# Patient Record
Sex: FEMALE | Race: BLACK OR AFRICAN AMERICAN | NOT HISPANIC OR LATINO | Employment: OTHER | URBAN - METROPOLITAN AREA
[De-identification: names, ages, dates, MRNs, and addresses within clinical notes are randomized per-mention and may not be internally consistent; named-entity substitution may affect disease eponyms.]

---

## 2018-02-26 ENCOUNTER — HOSPITAL ENCOUNTER (INPATIENT)
Facility: HOSPITAL | Age: 78
LOS: 8 days | Discharge: HOSPICE | DRG: 871 | End: 2018-03-06
Attending: FAMILY MEDICINE | Admitting: INTERNAL MEDICINE
Payer: MEDICARE

## 2018-02-26 ENCOUNTER — ANESTHESIA EVENT (OUTPATIENT)
Dept: INTENSIVE CARE | Facility: HOSPITAL | Age: 78
DRG: 871 | End: 2018-02-26
Payer: MEDICARE

## 2018-02-26 ENCOUNTER — ANESTHESIA (OUTPATIENT)
Dept: INTENSIVE CARE | Facility: HOSPITAL | Age: 78
DRG: 871 | End: 2018-02-26
Payer: MEDICARE

## 2018-02-26 DIAGNOSIS — J96.01 ACUTE RESPIRATORY FAILURE WITH HYPOXIA AND HYPERCAPNIA: ICD-10-CM

## 2018-02-26 DIAGNOSIS — R56.9 SEIZURE: ICD-10-CM

## 2018-02-26 DIAGNOSIS — R79.89 ELEVATED TROPONIN: ICD-10-CM

## 2018-02-26 DIAGNOSIS — G93.40 ENCEPHALOPATHY: ICD-10-CM

## 2018-02-26 DIAGNOSIS — R57.9 SHOCK: ICD-10-CM

## 2018-02-26 DIAGNOSIS — R41.82 ALTERED MENTAL STATUS: ICD-10-CM

## 2018-02-26 DIAGNOSIS — I95.9 HYPOTENSION, UNSPECIFIED HYPOTENSION TYPE: ICD-10-CM

## 2018-02-26 DIAGNOSIS — R74.01 TRANSAMINITIS: ICD-10-CM

## 2018-02-26 DIAGNOSIS — J96.02 ACUTE RESPIRATORY FAILURE WITH HYPOXIA AND HYPERCAPNIA: ICD-10-CM

## 2018-02-26 DIAGNOSIS — A41.9 SEPTIC SHOCK: ICD-10-CM

## 2018-02-26 DIAGNOSIS — R41.82 ALTERED MENTAL STATUS, UNSPECIFIED ALTERED MENTAL STATUS TYPE: ICD-10-CM

## 2018-02-26 DIAGNOSIS — G93.40 ACUTE ENCEPHALOPATHY: ICD-10-CM

## 2018-02-26 DIAGNOSIS — R82.4 KETONURIA: ICD-10-CM

## 2018-02-26 DIAGNOSIS — N17.9 AKI (ACUTE KIDNEY INJURY): ICD-10-CM

## 2018-02-26 DIAGNOSIS — E87.20 ACIDOSIS: ICD-10-CM

## 2018-02-26 DIAGNOSIS — Z45.2 ENCOUNTER FOR CENTRAL LINE PLACEMENT: ICD-10-CM

## 2018-02-26 DIAGNOSIS — N18.30 CKD (CHRONIC KIDNEY DISEASE), STAGE III: ICD-10-CM

## 2018-02-26 DIAGNOSIS — R00.1 BRADYCARDIA: ICD-10-CM

## 2018-02-26 DIAGNOSIS — R41.82 ALTERED MENTAL STATE: Primary | ICD-10-CM

## 2018-02-26 DIAGNOSIS — F03.C0 ADVANCED DEMENTIA: ICD-10-CM

## 2018-02-26 DIAGNOSIS — N30.00 ACUTE CYSTITIS WITHOUT HEMATURIA: ICD-10-CM

## 2018-02-26 DIAGNOSIS — J18.9 PNEUMONIA OF RIGHT LOWER LOBE DUE TO INFECTIOUS ORGANISM: ICD-10-CM

## 2018-02-26 DIAGNOSIS — R65.21 SEPTIC SHOCK: ICD-10-CM

## 2018-02-26 DIAGNOSIS — K92.2 GASTROINTESTINAL HEMORRHAGE, UNSPECIFIED GASTROINTESTINAL HEMORRHAGE TYPE: ICD-10-CM

## 2018-02-26 LAB
ALBUMIN SERPL BCP-MCNC: 3 G/DL
ALBUMIN SERPL BCP-MCNC: 3.7 G/DL
ALLENS TEST: ABNORMAL
ALP SERPL-CCNC: 104 U/L
ALP SERPL-CCNC: 88 U/L
ALT SERPL W/O P-5'-P-CCNC: 305 U/L
ALT SERPL W/O P-5'-P-CCNC: 316 U/L
AMMONIA BLD-SCNC: <9 UMOL/L
AMPHET+METHAMPHET UR QL: NEGATIVE
ANION GAP SERPL CALC-SCNC: 10 MMOL/L
ANION GAP SERPL CALC-SCNC: 10 MMOL/L
ANISOCYTOSIS BLD QL SMEAR: SLIGHT
ANISOCYTOSIS BLD QL SMEAR: SLIGHT
APAP SERPL-MCNC: <3 UG/ML
AST SERPL-CCNC: 171 U/L
AST SERPL-CCNC: 194 U/L
BACTERIA #/AREA URNS AUTO: ABNORMAL /HPF
BARBITURATES UR QL SCN>200 NG/ML: NEGATIVE
BASOPHILS # BLD AUTO: 0 K/UL
BASOPHILS # BLD AUTO: ABNORMAL K/UL
BASOPHILS NFR BLD: 0 %
BASOPHILS NFR BLD: 0 %
BENZODIAZ UR QL SCN>200 NG/ML: NEGATIVE
BILIRUB SERPL-MCNC: 0.2 MG/DL
BILIRUB SERPL-MCNC: 0.3 MG/DL
BILIRUB UR QL STRIP: NEGATIVE
BUN SERPL-MCNC: 19 MG/DL
BUN SERPL-MCNC: 20 MG/DL
BURR CELLS BLD QL SMEAR: ABNORMAL
BURR CELLS BLD QL SMEAR: ABNORMAL
BZE UR QL SCN: NEGATIVE
CALCIUM SERPL-MCNC: 10.1 MG/DL
CALCIUM SERPL-MCNC: 9.1 MG/DL
CANNABINOIDS UR QL SCN: NEGATIVE
CHLORIDE SERPL-SCNC: 102 MMOL/L
CHLORIDE SERPL-SCNC: 106 MMOL/L
CLARITY UR REFRACT.AUTO: CLEAR
CO2 SERPL-SCNC: 22 MMOL/L
CO2 SERPL-SCNC: 30 MMOL/L
COLOR UR AUTO: YELLOW
CREAT SERPL-MCNC: 1.27 MG/DL
CREAT SERPL-MCNC: 1.4 MG/DL
CREAT UR-MCNC: 224.3 MG/DL
DELSYS: ABNORMAL
DIFFERENTIAL METHOD: ABNORMAL
DIFFERENTIAL METHOD: ABNORMAL
EOSINOPHIL # BLD AUTO: 0 K/UL
EOSINOPHIL # BLD AUTO: ABNORMAL K/UL
EOSINOPHIL NFR BLD: 0 %
EOSINOPHIL NFR BLD: 0.4 %
ERYTHROCYTE [DISTWIDTH] IN BLOOD BY AUTOMATED COUNT: 16.8 %
ERYTHROCYTE [DISTWIDTH] IN BLOOD BY AUTOMATED COUNT: 17.4 %
EST. GFR  (AFRICAN AMERICAN): 42 ML/MIN/1.73 M^2
EST. GFR  (AFRICAN AMERICAN): 46.7 ML/MIN/1.73 M^2
EST. GFR  (NON AFRICAN AMERICAN): 36 ML/MIN/1.73 M^2
EST. GFR  (NON AFRICAN AMERICAN): 40.5 ML/MIN/1.73 M^2
FLUAV AG SPEC QL IA: NEGATIVE
FLUBV AG SPEC QL IA: NEGATIVE
GLUCOSE SERPL-MCNC: 218 MG/DL
GLUCOSE SERPL-MCNC: 97 MG/DL
GLUCOSE UR QL STRIP: NEGATIVE
HCO3 UR-SCNC: 21.6 MMOL/L (ref 24–28)
HCO3 UR-SCNC: 23.2 MMOL/L (ref 24–28)
HCO3 UR-SCNC: 23.4 MMOL/L (ref 24–28)
HCT VFR BLD AUTO: 37.4 %
HCT VFR BLD AUTO: 38.2 %
HCT VFR BLD CALC: 35 %PCV (ref 36–54)
HGB BLD-MCNC: 12 G/DL
HGB BLD-MCNC: 12.5 G/DL
HGB BLD-MCNC: 12.6 G/DL
HGB UR QL STRIP: NEGATIVE
HYALINE CASTS UR QL AUTO: 0 /LPF
HYPOCHROMIA BLD QL SMEAR: ABNORMAL
KETONES UR QL STRIP: ABNORMAL
LACTATE SERPL-SCNC: 0.9 MMOL/L
LACTATE SERPL-SCNC: 0.9 MMOL/L
LACTATE SERPL-SCNC: 3.4 MMOL/L
LEUKOCYTE ESTERASE UR QL STRIP: ABNORMAL
LYMPHOCYTES # BLD AUTO: 0.9 K/UL
LYMPHOCYTES # BLD AUTO: ABNORMAL K/UL
LYMPHOCYTES NFR BLD: 16 %
LYMPHOCYTES NFR BLD: 8.2 %
MCH RBC QN AUTO: 31.2 PG
MCH RBC QN AUTO: 31.7 PG
MCHC RBC AUTO-ENTMCNC: 33 G/DL
MCHC RBC AUTO-ENTMCNC: 33.4 G/DL
MCV RBC AUTO: 95 FL
MCV RBC AUTO: 95 FL
METHADONE UR QL SCN>300 NG/ML: NEGATIVE
MICROSCOPIC COMMENT: ABNORMAL
MONOCYTES # BLD AUTO: 0.5 K/UL
MONOCYTES # BLD AUTO: ABNORMAL K/UL
MONOCYTES NFR BLD: 1 %
MONOCYTES NFR BLD: 4.5 %
NEUTROPHILS # BLD AUTO: 9.1 K/UL
NEUTROPHILS # BLD AUTO: ABNORMAL K/UL
NEUTROPHILS NFR BLD: 80 %
NEUTROPHILS NFR BLD: 86.9 %
NEUTS BAND NFR BLD MANUAL: 3 %
NITRITE UR QL STRIP: NEGATIVE
NT-PROBNP: 135 PG/ML
OB PNL STL: POSITIVE
OPIATES UR QL SCN: NEGATIVE
OVALOCYTES BLD QL SMEAR: ABNORMAL
PCO2 BLDA: 53.9 MMHG (ref 35–45)
PCO2 BLDA: 54 MMHG (ref 35–45)
PCO2 BLDA: 62.2 MMHG (ref 35–45)
PCP UR QL SCN>25 NG/ML: NEGATIVE
PH SMN: 7.18 [PH] (ref 7.35–7.45)
PH SMN: 7.21 [PH] (ref 7.35–7.45)
PH SMN: 7.25 [PH] (ref 7.35–7.45)
PH UR STRIP: 5 [PH] (ref 5–8)
PLATELET # BLD AUTO: 71 K/UL
PLATELET # BLD AUTO: 81 K/UL
PLATELET BLD QL SMEAR: ABNORMAL
PLATELET BLD QL SMEAR: ABNORMAL
PMV BLD AUTO: ABNORMAL FL
PMV BLD AUTO: ABNORMAL FL
PO2 BLDA: 47 MMHG (ref 80–100)
PO2 BLDA: 62 MMHG (ref 80–100)
PO2 BLDA: 89 MMHG (ref 80–100)
POC BE: -4 MMOL/L
POC BE: -5 MMOL/L
POC BE: -6 MMOL/L
POC IONIZED CALCIUM: 1.25 MMOL/L (ref 1.06–1.42)
POC SATURATED O2: 70 % (ref 95–100)
POC SATURATED O2: 86 % (ref 95–100)
POC SATURATED O2: 95 % (ref 95–100)
POC TCO2: 23 MMOL/L (ref 23–27)
POC TCO2: 25 MMOL/L (ref 23–27)
POC TCO2: 25 MMOL/L (ref 23–27)
POCT GLUCOSE: 135 MG/DL (ref 70–110)
POIKILOCYTOSIS BLD QL SMEAR: SLIGHT
POIKILOCYTOSIS BLD QL SMEAR: SLIGHT
POTASSIUM BLD-SCNC: 3.9 MMOL/L (ref 3.5–5.1)
POTASSIUM SERPL-SCNC: 3.7 MMOL/L
POTASSIUM SERPL-SCNC: 4.7 MMOL/L
PROT SERPL-MCNC: 6.8 G/DL
PROT SERPL-MCNC: 6.9 G/DL
PROT UR QL STRIP: ABNORMAL
RBC # BLD AUTO: 3.94 M/UL
RBC # BLD AUTO: 4.04 M/UL
RBC #/AREA URNS AUTO: 1 /HPF (ref 0–4)
SALICYLATES SERPL-MCNC: <5 MG/DL
SAMPLE: ABNORMAL
SITE: ABNORMAL
SODIUM BLD-SCNC: 141 MMOL/L (ref 136–145)
SODIUM SERPL-SCNC: 138 MMOL/L
SODIUM SERPL-SCNC: 142 MMOL/L
SP GR UR STRIP: 1.02 (ref 1–1.03)
SPECIMEN SOURCE: NORMAL
SQUAMOUS #/AREA URNS AUTO: 1 /HPF
TARGETS BLD QL SMEAR: ABNORMAL
TARGETS BLD QL SMEAR: ABNORMAL
TOXICOLOGY INFORMATION: NORMAL
TROPONIN I SERPL DL<=0.01 NG/ML-MCNC: 0.02 NG/ML
TROPONIN I SERPL DL<=0.01 NG/ML-MCNC: 0.02 NG/ML
URN SPEC COLLECT METH UR: ABNORMAL
UROBILINOGEN UR STRIP-ACNC: 1 EU/DL
WBC # BLD AUTO: 10.44 K/UL
WBC # BLD AUTO: 9.13 K/UL
WBC #/AREA URNS AUTO: 2 /HPF (ref 0–5)

## 2018-02-26 PROCEDURE — 94760 N-INVAS EAR/PLS OXIMETRY 1: CPT

## 2018-02-26 PROCEDURE — 94761 N-INVAS EAR/PLS OXIMETRY MLT: CPT

## 2018-02-26 PROCEDURE — 0BH17EZ INSERTION OF ENDOTRACHEAL AIRWAY INTO TRACHEA, VIA NATURAL OR ARTIFICIAL OPENING: ICD-10-PCS | Performed by: ANESTHESIOLOGY

## 2018-02-26 PROCEDURE — 83605 ASSAY OF LACTIC ACID: CPT

## 2018-02-26 PROCEDURE — 37799 UNLISTED PX VASCULAR SURGERY: CPT

## 2018-02-26 PROCEDURE — 81000 URINALYSIS NONAUTO W/SCOPE: CPT

## 2018-02-26 PROCEDURE — 83880 ASSAY OF NATRIURETIC PEPTIDE: CPT

## 2018-02-26 PROCEDURE — 63600175 PHARM REV CODE 636 W HCPCS: Performed by: STUDENT IN AN ORGANIZED HEALTH CARE EDUCATION/TRAINING PROGRAM

## 2018-02-26 PROCEDURE — 93005 ELECTROCARDIOGRAM TRACING: CPT

## 2018-02-26 PROCEDURE — 36600 WITHDRAWAL OF ARTERIAL BLOOD: CPT

## 2018-02-26 PROCEDURE — 80329 ANALGESICS NON-OPIOID 1 OR 2: CPT

## 2018-02-26 PROCEDURE — 25000003 PHARM REV CODE 250: Performed by: INTERNAL MEDICINE

## 2018-02-26 PROCEDURE — 83605 ASSAY OF LACTIC ACID: CPT | Mod: 91

## 2018-02-26 PROCEDURE — 82803 BLOOD GASES ANY COMBINATION: CPT

## 2018-02-26 PROCEDURE — 20000000 HC ICU ROOM

## 2018-02-26 PROCEDURE — 84439 ASSAY OF FREE THYROXINE: CPT

## 2018-02-26 PROCEDURE — 85025 COMPLETE CBC W/AUTO DIFF WBC: CPT

## 2018-02-26 PROCEDURE — 96375 TX/PRO/DX INJ NEW DRUG ADDON: CPT | Mod: 59

## 2018-02-26 PROCEDURE — 63600175 PHARM REV CODE 636 W HCPCS

## 2018-02-26 PROCEDURE — 83690 ASSAY OF LIPASE: CPT

## 2018-02-26 PROCEDURE — 27000221 HC OXYGEN, UP TO 24 HOURS

## 2018-02-26 PROCEDURE — 63600175 PHARM REV CODE 636 W HCPCS: Performed by: INTERNAL MEDICINE

## 2018-02-26 PROCEDURE — 85027 COMPLETE CBC AUTOMATED: CPT

## 2018-02-26 PROCEDURE — 84443 ASSAY THYROID STIM HORMONE: CPT

## 2018-02-26 PROCEDURE — 96366 THER/PROPH/DIAG IV INF ADDON: CPT | Mod: 59

## 2018-02-26 PROCEDURE — 99285 EMERGENCY DEPT VISIT HI MDM: CPT | Mod: 25

## 2018-02-26 PROCEDURE — 87086 URINE CULTURE/COLONY COUNT: CPT

## 2018-02-26 PROCEDURE — 84145 PROCALCITONIN (PCT): CPT

## 2018-02-26 PROCEDURE — 02HV33Z INSERTION OF INFUSION DEVICE INTO SUPERIOR VENA CAVA, PERCUTANEOUS APPROACH: ICD-10-PCS | Performed by: FAMILY MEDICINE

## 2018-02-26 PROCEDURE — 96361 HYDRATE IV INFUSION ADD-ON: CPT | Mod: 59

## 2018-02-26 PROCEDURE — 96365 THER/PROPH/DIAG IV INF INIT: CPT | Mod: 59

## 2018-02-26 PROCEDURE — 36556 INSERT NON-TUNNEL CV CATH: CPT

## 2018-02-26 PROCEDURE — 85007 BL SMEAR W/DIFF WBC COUNT: CPT

## 2018-02-26 PROCEDURE — 80074 ACUTE HEPATITIS PANEL: CPT

## 2018-02-26 PROCEDURE — 80053 COMPREHEN METABOLIC PANEL: CPT

## 2018-02-26 PROCEDURE — 93010 ELECTROCARDIOGRAM REPORT: CPT | Mod: ,,, | Performed by: INTERNAL MEDICINE

## 2018-02-26 PROCEDURE — 87400 INFLUENZA A/B EACH AG IA: CPT

## 2018-02-26 PROCEDURE — 94002 VENT MGMT INPAT INIT DAY: CPT

## 2018-02-26 PROCEDURE — 5A1945Z RESPIRATORY VENTILATION, 24-96 CONSECUTIVE HOURS: ICD-10-PCS | Performed by: INTERNAL MEDICINE

## 2018-02-26 PROCEDURE — 80307 DRUG TEST PRSMV CHEM ANLYZR: CPT

## 2018-02-26 PROCEDURE — 80053 COMPREHEN METABOLIC PANEL: CPT | Mod: 91

## 2018-02-26 PROCEDURE — 82272 OCCULT BLD FECES 1-3 TESTS: CPT

## 2018-02-26 PROCEDURE — 84484 ASSAY OF TROPONIN QUANT: CPT

## 2018-02-26 PROCEDURE — 25000003 PHARM REV CODE 250: Performed by: STUDENT IN AN ORGANIZED HEALTH CARE EDUCATION/TRAINING PROGRAM

## 2018-02-26 PROCEDURE — 87040 BLOOD CULTURE FOR BACTERIA: CPT

## 2018-02-26 PROCEDURE — 36415 COLL VENOUS BLD VENIPUNCTURE: CPT

## 2018-02-26 PROCEDURE — 84484 ASSAY OF TROPONIN QUANT: CPT | Mod: 91

## 2018-02-26 PROCEDURE — 27100108

## 2018-02-26 PROCEDURE — 82140 ASSAY OF AMMONIA: CPT

## 2018-02-26 PROCEDURE — 25000003 PHARM REV CODE 250: Performed by: FAMILY MEDICINE

## 2018-02-26 PROCEDURE — 63600175 PHARM REV CODE 636 W HCPCS: Performed by: FAMILY MEDICINE

## 2018-02-26 PROCEDURE — 27200966 HC CLOSED SUCTION SYSTEM

## 2018-02-26 RX ORDER — PHENYLEPHRINE HYDROCHLORIDE 10 MG/ML
INJECTION INTRAVENOUS
Status: DISPENSED
Start: 2018-02-26 | End: 2018-02-27

## 2018-02-26 RX ORDER — FENTANYL CITRATE 50 UG/ML
INJECTION, SOLUTION INTRAMUSCULAR; INTRAVENOUS
Status: COMPLETED
Start: 2018-02-26 | End: 2018-02-26

## 2018-02-26 RX ORDER — CEFTRIAXONE 1 G/1
1 INJECTION, POWDER, FOR SOLUTION INTRAMUSCULAR; INTRAVENOUS
Status: COMPLETED | OUTPATIENT
Start: 2018-02-26 | End: 2018-02-26

## 2018-02-26 RX ORDER — FENTANYL CITRAT/DEXTROSE 5%/PF 100 MCG/10
PATIENT CONTROLLED ANALGESIA SYRINGE INTRAVENOUS CONTINUOUS
Status: DISCONTINUED | OUTPATIENT
Start: 2018-02-26 | End: 2018-02-28

## 2018-02-26 RX ORDER — AMLODIPINE BESYLATE 5 MG/1
5 TABLET ORAL DAILY
COMMUNITY

## 2018-02-26 RX ORDER — OSELTAMIVIR PHOSPHATE 6 MG/ML
75 FOR SUSPENSION ORAL 2 TIMES DAILY
Status: DISCONTINUED | OUTPATIENT
Start: 2018-02-26 | End: 2018-02-27 | Stop reason: DRUGHIGH

## 2018-02-26 RX ORDER — NOREPINEPHRINE BITARTRATE/D5W 16MG/250ML
0.02 PLASTIC BAG, INJECTION (ML) INTRAVENOUS CONTINUOUS
Status: DISCONTINUED | OUTPATIENT
Start: 2018-02-26 | End: 2018-03-01

## 2018-02-26 RX ORDER — DOPAMINE HYDROCHLORIDE 160 MG/100ML
INJECTION, SOLUTION INTRAVENOUS
Status: DISPENSED
Start: 2018-02-26 | End: 2018-02-27

## 2018-02-26 RX ORDER — DOPAMINE HYDROCHLORIDE 160 MG/100ML
10 INJECTION, SOLUTION INTRAVENOUS CONTINUOUS
Status: DISCONTINUED | OUTPATIENT
Start: 2018-02-26 | End: 2018-02-27

## 2018-02-26 RX ORDER — SODIUM CHLORIDE 9 MG/ML
INJECTION, SOLUTION INTRAVENOUS CONTINUOUS
Status: DISCONTINUED | OUTPATIENT
Start: 2018-02-26 | End: 2018-02-27

## 2018-02-26 RX ORDER — FENTANYL CITRATE 50 UG/ML
50 INJECTION, SOLUTION INTRAMUSCULAR; INTRAVENOUS ONCE
Status: COMPLETED | OUTPATIENT
Start: 2018-02-26 | End: 2018-02-26

## 2018-02-26 RX ORDER — METOPROLOL TARTRATE 100 MG/1
50 TABLET ORAL 2 TIMES DAILY
Status: ON HOLD | COMMUNITY
End: 2018-03-06 | Stop reason: HOSPADM

## 2018-02-26 RX ORDER — DOPAMINE HYDROCHLORIDE 160 MG/100ML
10 INJECTION, SOLUTION INTRAVENOUS CONTINUOUS
Status: DISCONTINUED | OUTPATIENT
Start: 2018-02-26 | End: 2018-02-26

## 2018-02-26 RX ADMIN — PIPERACILLIN AND TAZOBACTAM 4.5 G: 4; .5 INJECTION, POWDER, LYOPHILIZED, FOR SOLUTION INTRAVENOUS; PARENTERAL at 11:02

## 2018-02-26 RX ADMIN — SODIUM CHLORIDE 500 ML: 0.9 INJECTION, SOLUTION INTRAVENOUS at 12:02

## 2018-02-26 RX ADMIN — FENTANYL CITRATE 50 MCG: 50 INJECTION INTRAMUSCULAR; INTRAVENOUS at 09:02

## 2018-02-26 RX ADMIN — PHENYLEPHRINE HYDROCHLORIDE 24.95 MCG/MIN: 10 INJECTION INTRAVENOUS at 04:02

## 2018-02-26 RX ADMIN — SODIUM CHLORIDE 500 ML: 0.9 INJECTION, SOLUTION INTRAVENOUS at 03:02

## 2018-02-26 RX ADMIN — CEFTRIAXONE SODIUM 1 G: 1 INJECTION, POWDER, FOR SOLUTION INTRAMUSCULAR; INTRAVENOUS at 04:02

## 2018-02-26 RX ADMIN — SODIUM CHLORIDE: 0.9 INJECTION, SOLUTION INTRAVENOUS at 11:02

## 2018-02-26 RX ADMIN — VANCOMYCIN HYDROCHLORIDE 1000 MG: 1 INJECTION, POWDER, LYOPHILIZED, FOR SOLUTION INTRAVENOUS at 08:02

## 2018-02-26 RX ADMIN — FENTANYL CITRATE 50 MCG: 50 INJECTION, SOLUTION INTRAMUSCULAR; INTRAVENOUS at 09:02

## 2018-02-26 RX ADMIN — DOPAMINE HYDROCHLORIDE IN DEXTROSE 10 MCG/KG/MIN: 1.6 INJECTION, SOLUTION INTRAVENOUS at 03:02

## 2018-02-26 RX ADMIN — PHENYLEPHRINE HYDROCHLORIDE 4 MCG/MIN: 10 INJECTION INTRAVENOUS at 09:02

## 2018-02-26 RX ADMIN — Medication: at 09:02

## 2018-02-26 RX ADMIN — Medication 0.02 MCG/KG/MIN: at 08:02

## 2018-02-26 NOTE — ED NOTES
Pt arrival per EMS, EMS states family stated her MD increased her Trazadone from 50 mg to 100 mg nightly, family states pt has altered mental status this am, pt isn't talking, pt usually talks and walks. Pt has Bradycardia on the monitor

## 2018-02-26 NOTE — ED PROVIDER NOTES
Encounter Date: 2/26/2018       History     Chief Complaint   Patient presents with    Altered Mental Status     Pt arrival per EMS, EMS states family stated her MD increased her Trazadone from 50 mg to 100 mg nightly, family states pt has altered mental status this am, pt isn't talking, pt usually talks and walks.     78-year-old female presents with chief complaint altered mental status.  Family reports they've physician recently increased the dose of medication last night from 50 to 100 mg.  Reports that the patient was difficult to arouse this morning.  Patient has not been talking this morning and normally walks and talks normally.  Patient not answering questions or following commands at present.          Review of patient's allergies indicates:  No Known Allergies  Past Medical History:   Diagnosis Date    Alzheimer disease     Hyperlipidemia     Hypertension      History reviewed. No pertinent surgical history.  History reviewed. No pertinent family history.  Social History   Substance Use Topics    Smoking status: Never Smoker    Smokeless tobacco: Never Used    Alcohol use No     Review of Systems   Constitutional: Negative for chills and fever.   Respiratory: Negative for cough.    Neurological: Negative for dizziness and syncope.   All other systems reviewed and are negative.      Physical Exam     Initial Vitals [02/26/18 1020]   BP Pulse Resp Temp SpO2   128/83 (!) 45 16 97.9 °F (36.6 °C) 100 %      MAP       98         Physical Exam    Nursing note and vitals reviewed.  Constitutional: She appears well-developed and well-nourished. She is not diaphoretic. No distress.   HENT:   Head: Normocephalic and atraumatic.   Eyes: Pupils are equal, round, and reactive to light.   Neck: Neck supple.   Cardiovascular: Normal rate, regular rhythm and normal heart sounds.   Pulmonary/Chest: Breath sounds normal.   Abdominal: Soft. There is no tenderness.   Patient appears in no apparent distress  "  Musculoskeletal: Normal range of motion.   Neurological: She is alert. GCS eye subscore is 4. GCS verbal subscore is 1. GCS motor subscore is 6.   Patient is alert but is nonverbal at present,  Appears to follow commands intermittently   Skin: Skin is warm.   Psychiatric: She has a normal mood and affect. Her behavior is normal.         ED Course   Central Line  Date/Time: 2/26/2018 4:15 PM  Location procedure was performed: [facility] EMERGENCY DEPARTMENT  Performed by: NINA GILLIS  Assisting provider: ARNIE GILLIS  Pre-operative Diagnosis: hypotension  Post-operative diagnosis: hypotension  Consent Done: Yes  Time out: Immediately prior to procedure a "time out" was called to verify the correct patient, procedure, equipment, support staff and site/side marked as required.  Indications: med administration, vascular access and hemodynamic monitoring  Anesthesia: local infiltration    Anesthesia:  Local Anesthetic: lidocaine 1% without epinephrine  Anesthetic total: 5 mL  Preparation: skin prepped with ChloraPrep  Skin prep agent dried: skin prep agent completely dried prior to procedure  Sterile barriers: all five maximum sterile barriers used - cap, mask, sterile gown, sterile gloves, and large sterile sheet  Hand hygiene: hand hygiene performed prior to central venous catheter insertion  Location details: left internal jugular  Catheter type: triple lumen  Catheter size: 7 Fr  Catheter Length: 12cm    Ultrasound guidance: no  Manometry: No   Number of attempts: 1  Assessment: placement verified by x-ray,  no pneumothorax on x-ray,  tip termination and successful placement  Complications: none  Specimens: No  Implants: No  Post-procedure: line sutured,  chlorhexidine patch,  sterile dressing applied and blood return through all ports  Complications: No        Labs Reviewed   CBC W/ AUTO DIFFERENTIAL - Abnormal; Notable for the following:        Result Value    RBC 3.94 (*)     MCH 31.7 (*)     RDW 17.4 (*)  "    Platelets 81 (*)     All other components within normal limits   COMPREHENSIVE METABOLIC PANEL   TROPONIN I   URINALYSIS   DRUG SCREEN PANEL, URINE EMERGENCY     EKG Readings: (Independently Interpreted)   Initial Reading: No STEMI. Rhythm: Sinus Bradycardia.   Sinus bradycardia at 41 beats for minute normal IA normal QRS and no acute ST segment changes noted          Medical Decision Making:   History:   I obtained history from: someone other than patient.       <> Summary of History: 78-year-old female with history of Alzheimer's type dementia which is slowly declining as per family.  Son reports patient [birth year] birthday was a couple weeks ago and on every birthday notes that the patient is actually slightly doing worse.  Reports that she is normally up and walking around and talking but today has had her eyes open but hasn't not responding verbally at all.  Appears to be responding to his voice but is not normal self.  Son states normally give the patient between 50 and 75 mg of trazodone nightly that last p.m. administered 150 mg and believes that the patient is currently overmedicated.  Old Records Summarized: records from clinic visits and records from previous admission(s).       <> Summary of Records: Alzheimer's  dementia history of near syncope  Initial Assessment:   Patient appears to be alert but nonverbal at present does appear somnolent.  Appears to be hemodynamically stable.  Differential Diagnosis:   CVA, UTI, pneumonia, delirium, encephalitis, encephalopathy, dementia  Independently Interpreted Test(s):   I have ordered and independently interpreted X-rays - see prior notes.  I have ordered and independently interpreted EKG Reading(s) - see prior notes  Clinical Tests:   Lab Tests: Ordered and Reviewed  The following lab test(s) were unremarkable: CBC, CMP, Ammonia, Lactate, Troponin and Urinalysis       <> Summary of Lab: No leukocytosis noted CMP grossly normal ammonia level undetectable lactic  acid normal troponin slightly elevated urinalysis reveals evidence of urinary tract infection as this is a catheter UA specimen.  Radiological Study: Ordered and Reviewed  Medical Tests: Ordered and Reviewed  ED Management:  Patient initially treated with IV fluids and close observation on completion of close observation patient appears stable but unchanged from plan to admit the patient to the hospital for continued observation.  While in the emergency room pending Perfecto placement patient became hypotensive after administering a bolus of 500 mils blood pressure seemingly improved.  After the bolus was complete blood pressure began to trend downwards again which point I obtained a repeat laboratory analysis as suspicious for hypotensive shock.  Patient continues to sat well on room air but will obtain a ABG.  No tachypnea noted still retaining 100% sats on 2 L nasal cannula.  Upon reevaluation patient now appears to have some lower abdominal pain as she grimaces on examination will obtain a CT of abdomen and pelvis with contrast to further elucidate the etiology.  Other:   I have discussed this case with another health care provider.       <> Summary of the Discussion: Discussed the case initially with Dr. Delatorre who agreed to admit the patient hospital for observation.      3:30 PM patient now appears to be getting hypotensive cuff repositioned blood pressure reassessed.  Will administer a bolus of IV fluids vital signs repeated patient still afebrile will obtain blood and urine cultures   4:15 PM Dr. Delatorre paged to update on patient status    5 PM patient still hypotensive at present attempting that discussed with family to clarify DO NOT RESUSCITATE status              Clinical Impression:   The primary encounter diagnosis was Altered mental state. Diagnoses of Bradycardia, Ketonuria, Hypotension, unspecified hypotension type, Encounter for central line placement, Transaminitis, Gastrointestinal  hemorrhage, unspecified gastrointestinal hemorrhage type, Altered mental status, Acidosis, Acute cystitis without hematuria, Shock, and Septic shock were also pertinent to this visit.                           Migel Bueno MD  02/26/18 1425       Migel Bueno MD  02/26/18 1622       Migel Bueno MD  02/26/18 1639       Migel Bueno MD  02/27/18 1501

## 2018-02-26 NOTE — ED NOTES
Son at bedside stated someone at the house gave her - her meds and he normally break the trazadone in half but she was given the entire pill 150 mg. Son Mandeep stated prior to today his mother was walking and talking- she remains nonverbal.

## 2018-02-26 NOTE — SIGNIFICANT EVENT
Results for EVELYN CORTES (MRN [id number]) as of 2/26/2018 17:21   Ref. Range 2/26/2018 17:00   POC Sodium Latest Ref Range: 136 - 145 mmol/L 141   POC Potassium Latest Ref Range: 3.5 - 5.1 mmol/L 3.9   POC Ionized Calcium Latest Ref Range: 1.06 - 1.42 mmol/L 1.25   POC Hematocrit Latest Ref Range: 36 - 54 %PCV 35 (L)   POC HEMOGLOBIN Latest Units: g/dL 12   POC PH Latest Ref Range: 7.35 - 7.45  7.245 (LL)   POC PCO2 Latest Ref Range: 35 - 45 mmHg 54.0 (H)   POC PO2 Latest Ref Range: 80 - 100 mmHg 89   POC BE Latest Ref Range: -2 to 2 mmol/L -4   POC HCO3 Latest Ref Range: 24 - 28 mmol/L 23.4 (L)   POC SATURATED O2 Latest Ref Range: 95 - 100 % 95   POC TCO2 Latest Ref Range: 23 - 27 mmol/L 25   Sample Unknown ARTERIAL   DelSys Unknown Nasal Can   Allens Test Unknown Pass   Site Unknown RF   AGB DONE AT BEDSIDE DR. NINA GILLIS M.D. AT BEDSIDE RESULTS GIVEN NO NEW ORDERS NOTED. PER LACIE RIZVI CRT

## 2018-02-26 NOTE — ED TRIAGE NOTES
Pt arrival per EMS, EMS states family stated her MD increased her Trazadone from 50 mg to 100 mg nightly, family states pt has altered mental status this am, pt isn't talking, pt usually talks and walks.

## 2018-02-26 NOTE — ED NOTES
According to pt chrisaura Stafford is pt's sister [phone]  and she has power of  Medical and financial. Called pt's sis no answer.

## 2018-02-26 NOTE — ED NOTES
Called N for Auth for Jose spoke to Kady - someone is in the chart processing auth. She requested I call her back in a few.

## 2018-02-27 PROBLEM — R74.01 TRANSAMINITIS: Status: ACTIVE | Noted: 2018-02-27

## 2018-02-27 PROBLEM — J18.9 PNEUMONIA OF RIGHT LOWER LOBE DUE TO INFECTIOUS ORGANISM: Status: ACTIVE | Noted: 2018-02-27

## 2018-02-27 PROBLEM — R57.9 SHOCK: Status: ACTIVE | Noted: 2018-02-27

## 2018-02-27 PROBLEM — R79.89 ELEVATED TROPONIN: Status: ACTIVE | Noted: 2018-02-27

## 2018-02-27 PROBLEM — A41.9 SEPTIC SHOCK: Status: ACTIVE | Noted: 2018-02-27

## 2018-02-27 PROBLEM — N17.9 AKI (ACUTE KIDNEY INJURY): Status: ACTIVE | Noted: 2018-02-27

## 2018-02-27 PROBLEM — J96.01 ACUTE RESPIRATORY FAILURE WITH HYPOXIA AND HYPERCAPNIA: Status: ACTIVE | Noted: 2018-02-27

## 2018-02-27 PROBLEM — J96.02 ACUTE RESPIRATORY FAILURE WITH HYPOXIA AND HYPERCAPNIA: Status: ACTIVE | Noted: 2018-02-27

## 2018-02-27 PROBLEM — F03.C0 ADVANCED DEMENTIA: Status: ACTIVE | Noted: 2018-02-27

## 2018-02-27 PROBLEM — G93.40 ACUTE ENCEPHALOPATHY: Status: ACTIVE | Noted: 2018-02-27

## 2018-02-27 PROBLEM — N18.30 CKD (CHRONIC KIDNEY DISEASE), STAGE III: Status: ACTIVE | Noted: 2018-02-27

## 2018-02-27 PROBLEM — R65.21 SEPTIC SHOCK: Status: ACTIVE | Noted: 2018-02-27

## 2018-02-27 LAB
ALBUMIN SERPL BCP-MCNC: 2.4 G/DL
ALLENS TEST: ABNORMAL
ALP SERPL-CCNC: 78 U/L
ALT SERPL W/O P-5'-P-CCNC: 216 U/L
ANION GAP SERPL CALC-SCNC: 9 MMOL/L
AORTIC VALVE REGURGITATION: ABNORMAL
AST SERPL-CCNC: 113 U/L
BACTERIA UR CULT: NORMAL
BASOPHILS # BLD AUTO: 0.01 K/UL
BASOPHILS NFR BLD: 0.1 %
BILIRUB SERPL-MCNC: 0.4 MG/DL
BUN SERPL-MCNC: 19 MG/DL
CALCIUM SERPL-MCNC: 8.3 MG/DL
CHLORIDE SERPL-SCNC: 110 MMOL/L
CK SERPL-CCNC: 56 U/L
CO2 SERPL-SCNC: 18 MMOL/L
CORTIS SERPL-MCNC: 16.8 UG/DL
CORTIS SERPL-MCNC: 16.8 UG/DL
CORTIS SERPL-MCNC: 27.9 UG/DL
CORTIS SERPL-MCNC: 32.1 UG/DL
CREAT SERPL-MCNC: 1.3 MG/DL
CREAT UR-MCNC: 130.6 MG/DL
DELSYS: ABNORMAL
DIFFERENTIAL METHOD: ABNORMAL
EOSINOPHIL # BLD AUTO: 0 K/UL
EOSINOPHIL NFR BLD: 0.1 %
ERYTHROCYTE [DISTWIDTH] IN BLOOD BY AUTOMATED COUNT: 16.5 %
ERYTHROCYTE [SEDIMENTATION RATE] IN BLOOD BY WESTERGREN METHOD: 16 MM/H
EST. GFR  (AFRICAN AMERICAN): 45 ML/MIN/1.73 M^2
EST. GFR  (NON AFRICAN AMERICAN): 39 ML/MIN/1.73 M^2
ESTIMATED PA SYSTOLIC PRESSURE: 43.05
FIO2: 65
GIANT PLATELETS BLD QL SMEAR: PRESENT
GLUCOSE SERPL-MCNC: 62 MG/DL
HAV IGM SERPL QL IA: NEGATIVE
HBV CORE IGM SERPL QL IA: NEGATIVE
HBV SURFACE AG SERPL QL IA: NEGATIVE
HCO3 UR-SCNC: 18.4 MMOL/L (ref 24–28)
HCT VFR BLD AUTO: 32.6 %
HCV AB SERPL QL IA: NEGATIVE
HGB BLD-MCNC: 11.1 G/DL
LACTATE SERPL-SCNC: 1.7 MMOL/L
LACTATE SERPL-SCNC: 2.6 MMOL/L
LIPASE SERPL-CCNC: 137 U/L
LYMPHOCYTES # BLD AUTO: 0.4 K/UL
LYMPHOCYTES NFR BLD: 3.3 %
MCH RBC QN AUTO: 31.4 PG
MCHC RBC AUTO-ENTMCNC: 34 G/DL
MCV RBC AUTO: 92 FL
MITRAL VALVE REGURGITATION: ABNORMAL
MODE: ABNORMAL
MONOCYTES # BLD AUTO: 0.6 K/UL
MONOCYTES NFR BLD: 5 %
NEUTROPHILS # BLD AUTO: 10 K/UL
NEUTROPHILS NFR BLD: 91.5 %
PCO2 BLDA: 34.8 MMHG (ref 35–45)
PEEP: 5
PH SMN: 7.33 [PH] (ref 7.35–7.45)
PLATELET # BLD AUTO: 69 K/UL
PLATELET BLD QL SMEAR: ABNORMAL
PMV BLD AUTO: ABNORMAL FL
PO2 BLDA: 168 MMHG (ref 80–100)
POC BE: -7 MMOL/L
POC SATURATED O2: 99 % (ref 95–100)
POC TCO2: 19 MMOL/L (ref 23–27)
POCT GLUCOSE: 148 MG/DL (ref 70–110)
POCT GLUCOSE: 152 MG/DL (ref 70–110)
POCT GLUCOSE: 157 MG/DL (ref 70–110)
POCT GLUCOSE: 189 MG/DL (ref 70–110)
POCT GLUCOSE: 61 MG/DL (ref 70–110)
POTASSIUM SERPL-SCNC: 3.7 MMOL/L
PROCALCITONIN SERPL IA-MCNC: <0.02 NG/ML
PROT SERPL-MCNC: 5.4 G/DL
RBC # BLD AUTO: 3.54 M/UL
RETIRED EF AND QEF - SEE NOTES: 65 (ref 55–65)
SAMPLE: ABNORMAL
SITE: ABNORMAL
SODIUM SERPL-SCNC: 137 MMOL/L
SODIUM UR-SCNC: 23 MMOL/L
T4 FREE SERPL-MCNC: 1.06 NG/DL
TRICUSPID VALVE REGURGITATION: ABNORMAL
TROPONIN I SERPL DL<=0.01 NG/ML-MCNC: 0.14 NG/ML
TROPONIN I SERPL DL<=0.01 NG/ML-MCNC: 0.25 NG/ML
TSH SERPL DL<=0.005 MIU/L-ACNC: 1.16 UIU/ML
VANCOMYCIN SERPL-MCNC: 9.9 UG/ML
VT: 350
WBC # BLD AUTO: 10.92 K/UL

## 2018-02-27 PROCEDURE — 87070 CULTURE OTHR SPECIMN AEROBIC: CPT

## 2018-02-27 PROCEDURE — 63600175 PHARM REV CODE 636 W HCPCS: Performed by: STUDENT IN AN ORGANIZED HEALTH CARE EDUCATION/TRAINING PROGRAM

## 2018-02-27 PROCEDURE — 83605 ASSAY OF LACTIC ACID: CPT

## 2018-02-27 PROCEDURE — 93306 TTE W/DOPPLER COMPLETE: CPT

## 2018-02-27 PROCEDURE — 80202 ASSAY OF VANCOMYCIN: CPT

## 2018-02-27 PROCEDURE — 87205 SMEAR GRAM STAIN: CPT

## 2018-02-27 PROCEDURE — 84484 ASSAY OF TROPONIN QUANT: CPT

## 2018-02-27 PROCEDURE — 83605 ASSAY OF LACTIC ACID: CPT | Mod: 91

## 2018-02-27 PROCEDURE — 82550 ASSAY OF CK (CPK): CPT

## 2018-02-27 PROCEDURE — 27000221 HC OXYGEN, UP TO 24 HOURS

## 2018-02-27 PROCEDURE — 82533 TOTAL CORTISOL: CPT | Mod: 91

## 2018-02-27 PROCEDURE — 94761 N-INVAS EAR/PLS OXIMETRY MLT: CPT

## 2018-02-27 PROCEDURE — 25000003 PHARM REV CODE 250: Performed by: INTERNAL MEDICINE

## 2018-02-27 PROCEDURE — 36415 COLL VENOUS BLD VENIPUNCTURE: CPT

## 2018-02-27 PROCEDURE — 25000003 PHARM REV CODE 250: Performed by: STUDENT IN AN ORGANIZED HEALTH CARE EDUCATION/TRAINING PROGRAM

## 2018-02-27 PROCEDURE — 20000000 HC ICU ROOM

## 2018-02-27 PROCEDURE — 94003 VENT MGMT INPAT SUBQ DAY: CPT

## 2018-02-27 PROCEDURE — 85025 COMPLETE CBC W/AUTO DIFF WBC: CPT

## 2018-02-27 PROCEDURE — 82803 BLOOD GASES ANY COMBINATION: CPT

## 2018-02-27 PROCEDURE — 84484 ASSAY OF TROPONIN QUANT: CPT | Mod: 91

## 2018-02-27 PROCEDURE — 80053 COMPREHEN METABOLIC PANEL: CPT

## 2018-02-27 PROCEDURE — 99900026 HC AIRWAY MAINTENANCE (STAT)

## 2018-02-27 PROCEDURE — S0028 INJECTION, FAMOTIDINE, 20 MG: HCPCS | Performed by: INTERNAL MEDICINE

## 2018-02-27 PROCEDURE — 84300 ASSAY OF URINE SODIUM: CPT

## 2018-02-27 PROCEDURE — 82570 ASSAY OF URINE CREATININE: CPT

## 2018-02-27 RX ORDER — OSELTAMIVIR PHOSPHATE 6 MG/ML
30 FOR SUSPENSION ORAL DAILY
Status: DISCONTINUED | OUTPATIENT
Start: 2018-02-27 | End: 2018-02-28

## 2018-02-27 RX ORDER — DEXTROSE MONOHYDRATE, SODIUM CHLORIDE, AND POTASSIUM CHLORIDE 50; 1.49; 4.5 G/1000ML; G/1000ML; G/1000ML
INJECTION, SOLUTION INTRAVENOUS CONTINUOUS
Status: DISCONTINUED | OUTPATIENT
Start: 2018-02-27 | End: 2018-02-28

## 2018-02-27 RX ORDER — HEPARIN SODIUM 5000 [USP'U]/ML
5000 INJECTION, SOLUTION INTRAVENOUS; SUBCUTANEOUS EVERY 8 HOURS
Status: DISCONTINUED | OUTPATIENT
Start: 2018-02-27 | End: 2018-02-28

## 2018-02-27 RX ORDER — DEXAMETHASONE SODIUM PHOSPHATE 4 MG/ML
10 INJECTION, SOLUTION INTRA-ARTICULAR; INTRALESIONAL; INTRAMUSCULAR; INTRAVENOUS; SOFT TISSUE ONCE
Status: COMPLETED | OUTPATIENT
Start: 2018-02-27 | End: 2018-02-27

## 2018-02-27 RX ORDER — COSYNTROPIN 0.25 MG/ML
0.25 INJECTION, POWDER, FOR SOLUTION INTRAMUSCULAR; INTRAVENOUS ONCE
Status: COMPLETED | OUTPATIENT
Start: 2018-02-27 | End: 2018-02-27

## 2018-02-27 RX ORDER — IBUPROFEN 200 MG
24 TABLET ORAL
Status: DISCONTINUED | OUTPATIENT
Start: 2018-02-27 | End: 2018-03-06 | Stop reason: HOSPADM

## 2018-02-27 RX ORDER — IBUPROFEN 200 MG
16 TABLET ORAL
Status: DISCONTINUED | OUTPATIENT
Start: 2018-02-27 | End: 2018-03-06 | Stop reason: HOSPADM

## 2018-02-27 RX ORDER — FAMOTIDINE 10 MG/ML
20 INJECTION INTRAVENOUS 2 TIMES DAILY
Status: DISCONTINUED | OUTPATIENT
Start: 2018-02-27 | End: 2018-02-27 | Stop reason: DRUGHIGH

## 2018-02-27 RX ORDER — DEXAMETHASONE SODIUM PHOSPHATE 100 MG/10ML
10 INJECTION INTRAMUSCULAR; INTRAVENOUS ONCE
Status: DISCONTINUED | OUTPATIENT
Start: 2018-02-27 | End: 2018-02-27

## 2018-02-27 RX ORDER — FAMOTIDINE 10 MG/ML
20 INJECTION INTRAVENOUS DAILY
Status: DISCONTINUED | OUTPATIENT
Start: 2018-02-27 | End: 2018-03-01

## 2018-02-27 RX ORDER — DEXTROSE 50 % IN WATER (D50W) INTRAVENOUS SYRINGE
Status: DISPENSED
Start: 2018-02-27 | End: 2018-02-27

## 2018-02-27 RX ORDER — GLUCAGON 1 MG
1 KIT INJECTION
Status: DISCONTINUED | OUTPATIENT
Start: 2018-02-27 | End: 2018-03-06 | Stop reason: HOSPADM

## 2018-02-27 RX ADMIN — OSELTAMIVIR PHOSPHATE 30 MG: 6 POWDER, FOR SUSPENSION ORAL at 10:02

## 2018-02-27 RX ADMIN — DEXAMETHASONE SODIUM PHOSPHATE 10 MG: 4 INJECTION, SOLUTION INTRAMUSCULAR; INTRAVENOUS at 03:02

## 2018-02-27 RX ADMIN — DEXTROSE MONOHYDRATE, SODIUM CHLORIDE, AND POTASSIUM CHLORIDE: 50; 4.5; 1.49 INJECTION, SOLUTION INTRAVENOUS at 08:02

## 2018-02-27 RX ADMIN — COSYNTROPIN 0.25 MG: 0.25 INJECTION, POWDER, LYOPHILIZED, FOR SOLUTION INTRAVENOUS at 07:02

## 2018-02-27 RX ADMIN — PIPERACILLIN AND TAZOBACTAM 4.5 G: 4; .5 INJECTION, POWDER, LYOPHILIZED, FOR SOLUTION INTRAVENOUS; PARENTERAL at 02:02

## 2018-02-27 RX ADMIN — HEPARIN SODIUM 5000 UNITS: 5000 INJECTION, SOLUTION INTRAVENOUS; SUBCUTANEOUS at 06:02

## 2018-02-27 RX ADMIN — HEPARIN SODIUM 5000 UNITS: 5000 INJECTION, SOLUTION INTRAVENOUS; SUBCUTANEOUS at 02:02

## 2018-02-27 RX ADMIN — OSELTAMIVIR PHOSPHATE 75 MG: 6 POWDER, FOR SUSPENSION ORAL at 12:02

## 2018-02-27 RX ADMIN — HEPARIN SODIUM 5000 UNITS: 5000 INJECTION, SOLUTION INTRAVENOUS; SUBCUTANEOUS at 10:02

## 2018-02-27 RX ADMIN — DEXTROSE MONOHYDRATE 12.5 G: 500 INJECTION PARENTERAL at 07:02

## 2018-02-27 RX ADMIN — PIPERACILLIN AND TAZOBACTAM 4.5 G: 4; .5 INJECTION, POWDER, LYOPHILIZED, FOR SOLUTION INTRAVENOUS; PARENTERAL at 10:02

## 2018-02-27 RX ADMIN — FAMOTIDINE 20 MG: 10 INJECTION INTRAVENOUS at 12:02

## 2018-02-27 RX ADMIN — PHENYLEPHRINE HYDROCHLORIDE 0.8 MCG/KG/MIN: 10 INJECTION INTRAVENOUS at 03:02

## 2018-02-27 RX ADMIN — PIPERACILLIN AND TAZOBACTAM 4.5 G: 4; .5 INJECTION, POWDER, LYOPHILIZED, FOR SOLUTION INTRAVENOUS; PARENTERAL at 06:02

## 2018-02-27 NOTE — EICU
New admit    77 y/o F Alzheimer's dementia brought in by family due to altered sensorium, recently increased trazodone dose.  Noted to be bradycardic in ED and relatively hypotensive given fluids and started on dopamine and neosynephrine.  On review on meds, patient also on metoprolol succinate as well as tartrate.  Currently hypothermic as well.    Pertinent labs:  Elevated LFTs, creatinine 1.2 (baseline), lactate 0.9, TSH 0.30  EKG prolong QT    Camera assessment: not arousable  /79 HR 50-60s    · Suggest atropine at bedside  · Agree with empiric antibiotic treatment and de-escalate if with no clear source of infection  · RUQ US, ? Drug induced hepatitis  · May need glucagon if confirmed increase B blocker use  · ABG pending, bedside team discussed with family possibility of intubation

## 2018-02-27 NOTE — PROGRESS NOTES
"U Internal Medicine Resident HO-III Progress Note    Subjective:      Verna Carranza is a 78 y.o.  female who is being followed by the [facility] service at [facility] for septic shock. Patient intubated and sedated. Nurses able to wean off dopamine and phenylephrine last night, now titrating down levophed. UOP still low.     Objective:   Last 24 Hour Vital Signs:  BP  Min: 73/44  Max: 164/79  Temp  Av.5 °F (34.2 °C)  Min: 89.8 °F (32.1 °C)  Max: 97.9 °F (36.6 °C)  Pulse  Av  Min: 36  Max: 83  Resp  Av.7  Min: 8  Max: 31  SpO2  Av.9 %  Min: 75 %  Max: 100 %  Height  Av' 2" (157.5 cm)  Min: 5' 2" (157.5 cm)  Max: 5' 2" (157.5 cm)  Weight  Av.9 kg (105 lb 8 oz)  Min: 45.8 kg (101 lb)  Max: 49.9 kg (110 lb)  I/O last 3 completed shifts:  In: 2985 [I.V.:1785; IV Piggyback:1200]  Out: 107 [Urine:107]    Physical Examination:  /66   Pulse (!) 58   Temp 98 °F (36.7 °C) (Oral)   Resp 16   Ht 5' 2" (1.575 m)   Wt 45.8 kg (101 lb)   SpO2 100%   BMI 18.47 kg/m²   General appearance: intubated and sedated on fentanyl gtt  Head: Normocephalic, without obvious abnormality, atraumatic  Lungs: course breath sounds bilaterally anteriorly, satting mid-90s on vent  Heart: bradycardic in 50s, normal S1, S2, no murmurs  Abdomen: soft, non-tender; bowel sounds normal; no masses,  no organomegaly  Extremities: extremities normal, atraumatic, no cyanosis or edema  Pulses: 2+ and symmetric  Neurologic: pupils pinpoint, +gag, GCS 6T (E1V1M4), moving all extremities in response to pain     Laboratory:  Laboratory Data Reviewed: yes  Pertinent Findings:  H/H 11.1/32.6  plt 69  Bicarb 19, AG 9  Lactate 2.6    Microbiology Data Reviewed: yes  Pertinent Findings:  Rapid flu negative  Blood cx NGTD  Urine cx pending    Other Results:      Radiology Data Reviewed: yes  Pertinent Findings:  CT chest and CTAP w/o contrast : large consolidation in RLL, distended GB, possible " colonic wall thickening/colitis (limited eval without contrast)    Current Medications:     Infusions:   sodium chloride 0.9% 200 mL/hr at 02/27/18 0036    fentanyl 100 mcg/hr (02/26/18 2250)    norepinephrine bitartrate-D5W 0.3 mcg/kg/min (02/27/18 0600)    phenylephrine Stopped (02/27/18 0605)        Scheduled:   cosyntropin  0.25 mg Intravenous Once    heparin (porcine)  5,000 Units Subcutaneous Q8H    oseltamivir  75 mg Oral BID    piperacillin-tazobactam (ZOSYN) IVPB  4.5 g Intravenous Q8H    vancomycin (VANCOCIN) IVPB  15 mg/kg Intravenous Q24H        PRN:      Antibiotics and Day Number of Therapy:  Vanc/Zosyn/Tamiflu (started 2/27) Day 2    Lines and Day Number of Therapy:  [facility] (2/26)  PIV    Assessment:     Verna Carranza is a 78 y.o.female with  Patient Active Problem List    Diagnosis Date Noted    Septic shock 02/27/2018    Acute respiratory failure with hypoxia and hypercapnia 02/27/2018    Acute encephalopathy 02/27/2018    Altered mental state 02/26/2018        Plan:     Septic Shock with RLL Pneumonia  -patient with a few days of decreased mentation from baseline per family  -patient was hypothermic, bradycardic and hypotensive on admission with end organ damage (lactic acidosis, abnormal troponin, AMY)  -CXR negative for acute abnormality, however, lung sounds are course anteriorly, CT chest obtained which shows RLL pneumonia, likely aspiration 2/2 decreased mental status  -UA is nitrite negative but does have 1+ leuks, 2 WBC, moderate bacteria, may be additional potential source of infection, will f/u urine culture  -CTAP w/possible colitis  -given multiple possible sources of infection, covering broadly with abx as well as with tamiflu (although flu negative)  -given one-time dose of dexamethasone, patient now pending stim test to r/o adrenal insufficiency; patient now doing better after improvement of temp, however, if patient starts to decompensate again, will start stress dose  steroids; f/u stim test  -will treat with stress dose steroids (dex), check AM cortisol for possible adrenal insufficiency  -check TSH, free T4  -TTE shows no wall motion abnormalities, concentric hypertrophy, normal LVEF, normal RV systolic function, pulmonary hypertension (PA pressure 43), mild AR, mild MR, mild TR; do not feel that patient's shock is cardiogenic or obstructive in nature given TTE results  -patient started on dopamine and phenylephrine by [facility], central line placed; given suspicion for septic shock, transitioned to levophed monotherapy overnight, plan to add vasopressin as second pressor if needed; a-line placed by anesthesia for close monitoring of BPs  -bear hugger and warmed IVF for hypothermia, which has improved  -family wishes to pursue all resuscitative measures at this point, will continue to have goals of care discussion with the family given patient's advanced dementia and overall poor prognosis    Acute Encephalopathy  -CT head shows enlargement of ventricular system likely 2/2 central atrophy with no acute abnormalities; acute stroke still in the differential given altered mental status, may consider MRI brain and EEG when patient more stable   -tox is another consideration given hx BB, trazodone and recent initiation of donepezil, however, per son, patient has been receiving meds correctly and only change was higher dose of trazodone    Acute Hypercapneic and Hypoxic Respiratory Failure  -likely 2/2 acute encephalopathy as well as RLL pneumonia  -initial ABG at [facility] 7.245/54/89/23.4; repeat ABG here 7.1/62.2/47/23  -given GCS 7 and expected clinical course, patient intubated by anesthesia  -initial vent settings AC with  FiO2 65% RR 16 PEEP 5, titrating down FiO2 as tolerated  -pulm consulted for assistance with vent management  -fentanyl gtt for sedation     AMY on CKD3  -baseline Cr 1.1, Cr today 1.27  -will check FENa, continue IVF in setting of  shock     Transaminitis  -, , normal T. Bili on admission; trending down this AM  -CTAP with no explanation; acetaminopen/salicylate wnl  -possibly 2/2 shock vs. tox (trazodone cleared by liver)  -will trend    Thrombocytopenia  -plts 69 today, levels were normal last in 2015  -likely 2/2 acute illness; no signs of active bleeding, will monitor closely  -daily cbc     HTN  -on norvasc 5, toprol-xl 50 at home  -holding anti-hypertensives in setting of shock     Alzheimer's Dementia  -dementia is advanced as patient unable to perform ADLs, completely dependent on her son, primary caretaker  -on donepezil 5mg daily, recently started; will hold in setting of altered mentation, bradycardia and shock     HLD  -on crestor 10mg daily  -check lipid panel; hold in setting of acute illness     Diet: NPO  DVT Prophylaxis: heparin  Code Status: Full  Dispo: continue ICU level of care, pending clinical improvement and goals of care discussion with family given patient's overall poor prognosis    Reyna Hager  [facility] Internal Medicine HO-III  [facility] Internal Medicine Service Team A    [facility] Medicine Hospitalist Pager numbers:   [facility] Hospitalist Medicine Team A (Pepe/Nandini): [phone]  [facility] Hospitalist Medicine Team B (Maribell/Rhonda):  [phone]

## 2018-02-27 NOTE — NURSING
Received from [facility] ER via [facility], she appears nonverbal, not following commands, she is moving all extremities and appears to localize. Critical care monitoring enabled, difficult to obtain NiBP, able to get consistent pressure readings to the L thigh and is very cold at 90 deg oral and axillary. Brit Hugger applied at 43 deg C. Her MAP is currently being supported on dopamine at 20mcg/kg/min, and phenylephrine @ 4.2mcg/kg/min. She is lee in A-fib with a heart rate of 40's to 50's. CBG was normal. Attending medicine team paged to come to bedside to melyssa.

## 2018-02-27 NOTE — PLAN OF CARE
Problem: Patient Care Overview  Goal: Plan of Care Review  Outcome: Ongoing (interventions implemented as appropriate)  POC discussed with family before departure, questions answered. Telephone number of unit given to family as well.

## 2018-02-27 NOTE — PROGRESS NOTES
Pharmacist Renal Dose Adjustment Note    Verna Carranza is a 78 y.o. female being treated with the medication oseltamivir     Patient Data:    Vital Signs (Most Recent):  Temp: 97.4 °F (36.3 °C) (02/27/18 0815)  Pulse: (!) 58 (02/27/18 0935)  Resp: 16 (02/27/18 0935)  BP: 115/69 (02/27/18 0935)  SpO2: 100 % (02/27/18 0935)   Vital Signs (72h Range):  Temp:  [89.8 °F (32.1 °C)-97.9 °F (36.6 °C)]   Pulse:  [36-83]   Resp:  [8-31]   BP: ()/(44-87)   SpO2:  [75 %-100 %]   Arterial Line BP: (106-163)/(58-82)        Recent Labs     Lab 02/26/18  1112 02/26/18  2049 02/27/18  0605   CREATININE 1.27 1.4 1.3     Serum creatinine: 1.3 mg/dL 02/27/18 0605  Estimated creatinine clearance: 25.8 mL/min    Medication:oseltamivir dose: 75mg frequency bid will be changed to medication:oseltamivir dose:30 mg frequency:daily    Pharmacist's Name: Charu Peace  Pharmacist's Extension: [phone]

## 2018-02-27 NOTE — NURSING
S/W son, who is at the bedside, who confirms this is her normal behavior. She is mostly nonverbal and her mental status fluctuates.  Currently she moves all extremities, does not make eye contact or follow commands. She makes noises but is nonverbal. And she is localizing by picking at her central line dressing.

## 2018-02-27 NOTE — PLAN OF CARE
Pt brought to CT with transport monitor, IV gtts, ambu bag, RTx1, and ICU RN x3, via bed. Pt in stable condition. Pt returned to room at 0552 in stable condition.

## 2018-02-27 NOTE — PROGRESS NOTES
"Vancomycin Dosing and Monitoring Pharmacy Protocol    Verna Carranza is a 78 y.o. female    Height: 5' 2" (1.575 m)   Wt Readings from Last 1 Encounters:   02/26/18 45.8 kg (101 lb)     Ideal body weight: 50.1 kg (110 lb 7.2 oz)    Temp Readings from Last 3 Encounters:   02/27/18 98 °F (36.7 °C) (Oral)   03/12/15 98.6 °F (37 °C) (Oral)   06/26/14 98.7 °F (37.1 °C) (Oral)      Lab Results   Component Value Date/Time    WBC 10.92 02/27/2018 06:05 AM    WBC 9.13 02/26/2018 08:49 PM    WBC 10.44 02/26/2018 11:12 AM      Lab Results   Component Value Date/Time    CREATININE 1.3 02/27/2018 06:05 AM    CREATININE 1.4 02/26/2018 08:49 PM    CREATININE 1.27 02/26/2018 11:12 AM        Serum creatinine: 1.3 mg/dL 02/27/18 0605  Estimated creatinine clearance: 25.8 mL/min    Antibiotics       Start     Stop Route Frequency Ordered    02/28/18 2030  vancomycin 750 mg in dextrose 5 % 250 mL IVPB (ready to mix system)  (Vancomycin IVPB with levels panel)      -- IV Every 48 hours (non-standard times) 02/27/18 1257    02/26/18 2300  piperacillin-tazobactam 4.5 g in dextrose 5 % 100 mL IVPB (ready to mix system)      -- IV Every 8 hours (non-standard times) 02/26/18 1957          Antifungals       None            Microbiology Results (last 7 days)       Procedure Component Value Units Date/Time    Blood culture [[id number]] Collected:  02/26/18 1630    Order Status:  Completed Specimen:  Blood from Line, Jugular, Internal Left Updated:  02/27/18 0345     Blood Culture, Routine No Growth to date    Blood culture [[id number]] Collected:  02/26/18 1645    Order Status:  Completed Specimen:  Blood from Line, Jugular, Internal Left Updated:  02/27/18 0345     Blood Culture, Routine No Growth to date    Urine culture [[id number]] Collected:  02/26/18 2025    Order Status:  No result Specimen:  Urine from Catheterized Updated:  02/26/18 2026    Urine culture [[id number]]     Order Status:  Completed Specimen:  Urine     Urine culture " [[id number]]     Order Status:  Canceled Specimen:  Urine from Urine, Catheterized             Indication:   Sepsis    Target Level: 15-20 mcg/ml    Hemodialysis:   No on N/A    Dosing Weight:   ABW--actual body weight  If ABW is greater than or equal to 30% over Ideal Body Weight, AdjBW will be used to calculate vancomycin dose.    Last Vancomycin dose: 1000 mg   Date/Time given: 18          Vancomycin level:  No results for input(s): VANCOMYCIN-TROUGH in the last 72 hours.  No results for input(s): VANCOMYCIN, RANDOM in the last 72 hours.    Per Protocol Initial/Adjustments Dosin. Initial/Adjustment Dose: Initial Vancomycin dose = 750mg q48hr to be given at 18 date/time  2. Vancomycin Trough Level will be drawn on 3/2/18 1930 date/time    Pharmacy will continue to follow.    Please contact if you have any further questions. Thank you.    Preet Hdez, PharmD  [phone]

## 2018-02-27 NOTE — PLAN OF CARE
Pt intubated; TN received call from pt's granddaughter , Margarette Castillo, who provided all information. Margarette informed Tn pt' sister ( Nanci Stafford) who is in [place] is MPOA. TN requested she get a copy of POA and bring to hospital.Margarette stated pt lives with her youngest  son Aurelio who provides assistance with all ADLs ; pt has history of Alzheimers and no longer, dress, feeds, or bathes self. Pt is also incontinent. Margarette stated pt ambulates independently and does not have any DME and no home health. Margarette goes to house once a week to assist with ADLS.     TN to follow.       02/27/18 1315   Discharge Assessment   Assessment Type Discharge Planning Assessment   Confirmed/corrected address and phone number on facesheet? Yes   Assessment information obtained from? Caregiver   Expected Length of Stay (days) (tbd)   Communicated expected length of stay with patient/caregiver yes   Prior to hospitilization cognitive status: (nonverbal)   Prior to hospitalization functional status: Needs Assistance   Current cognitive status: Coma/Sedated/Intubated   Current Functional Status: Completely Dependent   Facility Arrived From: [facility]   Lives With child(jean-paul), adult  (Saloni )   Able to Return to Prior Arrangements yes   Is patient able to care for self after discharge? No   Who are your caregiver(s) and their phone number(s)? Margarette(granddaughter) [phone]   Patient's perception of discharge disposition home or selfcare   Readmission Within The Last 30 Days no previous admission in last 30 days   Patient currently receives any other outside agency services? No   Equipment Currently Used at Home none   Do you have any problems affording any of your prescribed medications? No   Is the patient taking medications as prescribed? (unknown)   Does the patient have transportation home? Yes   Transportation Available family or friend will provide   Does the patient receive services at the [facility]? No   Discharge  Plan B Home with family;Home Health;Skilled Nursing Facility   Patient/Family In Agreement With Plan yes

## 2018-02-27 NOTE — H&P
[facility] Internal Medicine History and Physical - Resident Note    Admitting Team: [facility] Hospital Medicine Team A  Attending Physician: Dr. Delatorre  Resident: Madan   Intern: [place]    Date of Admit: 2/26/2018    Chief Complaint     Altered mental status x1 day    Subjective:      History of Present Illness:    78yoF w/HTN, HLD, advanced Alzheimer's dementia (speaks inappropriate words at baseline, unable to perform ADLs, is administered meds at home by her son, eats regular food but has to be fed) was in her usual state of health until about 3-4 days ago when her son, Simba, noticed that she was sleeping more than usual. He denies noticing fevers, chills or any other symptoms other than wanting to sleep more during the day, although he reports she still ws not sleeping much at night. He does report that the patient only recently started taking donepezil a few days ago. Then, last night, Simba gave her twice as much trazodone as usual (took 150mg last night) because. This morning, the patient's other son went to the house to check on her and found her with decreased mentation from her baseline and completely nonverbal. It is unclear whether or not this was an acute change since last night or if the patient has been this way the last few days as there are conflicting accounts from family but the visiting son decided to bring her to the ER.     Initially, the patient was found to be bradycardic to the 40s at [facility] but had a normal temp, was normotensive with normal RR. CT head was negative and patient was to be admitted for AMS workup. However, while waiting for transport, the patient developed hypotension and was started on dopamine and phenylephrine by the ER after central line placement.    History obtained by patient's son and primary caretaker, Simba as well as patient's granddaughter, Margarette and her father (the patient's other son) as the patient is non-verbal.     Past Medical History:  Past Medical  "History:   Diagnosis Date    Alzheimer disease     Hyperlipidemia     Hypertension        Past Surgical History:  No known surgical history per family    Allergies:  Review of patient's allergies indicates:  No Known Allergies    Home Medications:  Amlodipine 5mg daily  Rosuvastatin 10mg daily  Toprol-XL 50mg daily  ASA 81mg daily  Donepezil 10mg daily  Trazodone 150mg nightly (was taking 75mg nightly until last night)    Family History:  History reviewed. No pertinent family history.    Social History:  Social History   Substance Use Topics    Smoking status: Never Smoker    Smokeless tobacco: Never Used    Alcohol use No       Review of Systems:  Pertinent items are noted in HPI. All other systems are reviewed and are negative.    Health Maintaince :   Primary Care Physician: unknown   Unknown immunizations and cancer screening status       Objective:   Last 24 Hour Vital Signs:  BP  Min: 73/44  Max: 131/87  Temp  Av °F (34.4 °C)  Min: 90 °F (32.2 °C)  Max: 97.9 °F (36.6 °C)  Pulse  Av.1  Min: 36  Max: 83  Resp  Av.5  Min: 9  Max: 29  SpO2  Av.1 %  Min: 75 %  Max: 100 %  Height  Av' 2" (157.5 cm)  Min: 5' 2" (157.5 cm)  Max: 5' 2" (157.5 cm)  Weight  Av.9 kg (105 lb 8 oz)  Min: 45.8 kg (101 lb)  Max: 49.9 kg (110 lb)  Body mass index is 18.47 kg/m².  I/O last 3 completed shifts:  In: 1000 [IV Piggyback:1000]  Out: -     ED VS: T 97.9F /83 HR 45 RR 16 SpO2 100% RA  Received 1500cc NS bolus by ER, was started on phenylephrine and dopamine prior to transfer to [place] for worsening hypotension  VS on my exam: T 90.0F /62 HR 64 RR 23 SpO2 90% on 3L NC    Physical Examination:   GEN: Patient appears older than stated age, somnolent but arousable to painful stimuli, not following commands  HEENT: NCAT, pupils 2mmg and minimally reactive, unable to test EOM as patient not following commands but no eye deviation noted; adentulous, increased oral secretions noted  CV: " irregularly irregular rhythm, rate-controlled, S1/S2, no murmurs  RESP: course breath sounds anteriorly, difficult to appreciate breath sounds posteriorly 2/2 poor inspiratory effort; no wheezes or definite crackles heard  ABD: soft, nondistended, patient does grimace with deep palpation  EXTR: non-edematous bilaterally, 1+ distal pulses x 4  NEURO: GCS 7 (E 1V2M4-not opening eyes, incomprehensible sounds, withdraws to pain), PERRL (2mm and reactive), unable to assess EOM as above, patient moves all 4 extremities in response to painful stimuli but not following commands     Laboratory:  Most Recent Data:  CBC:   Lab Results   Component Value Date    WBC 10.44 02/26/2018    HGB 12.5 02/26/2018    HCT 35 (L) 02/26/2018    PLT 81 (L) 02/26/2018    MCV 95 02/26/2018    RDW 17.4 (H) 02/26/2018     WBC Differential: 86.9 % N, 0 % Bands, 8.2 % L, 4.5 % M, 0.4 % Eo, 0 % Baso  BMP:   Lab Results   Component Value Date     02/26/2018    K 4.7 02/26/2018     02/26/2018    CO2 30 (H) 02/26/2018    BUN 20 (H) 02/26/2018    CREATININE 1.27 02/26/2018    GLU 97 02/26/2018    CALCIUM 10.1 02/26/2018    MG 2.1 03/12/2015    PHOS 4.3 03/12/2015     LFTs:   Lab Results   Component Value Date    PROT 6.9 02/26/2018    ALBUMIN 3.7 02/26/2018    BILITOT 0.2 02/26/2018     (H) 02/26/2018    ALKPHOS 88 02/26/2018     (H) 02/26/2018     Coags: No results found for: INR, PROTIME, PTT  FLP: No results found for: CHOL, HDL, LDLCALC, TRIG, CHOLHDL  DM:   Lab Results   Component Value Date    CREATININE 1.27 02/26/2018     Thyroid:   Lab Results   Component Value Date    TSH 0.304 (L) 03/12/2015    FREET4 0.94 03/12/2015     Anemia: No results found for: IRON, TIBC, FERRITIN, [id number], FOLATE  Cardiac:   Lab Results   Component Value Date    TROPONINI 0.024 02/26/2018    BNP 26 03/12/2015     Urinalysis:   Lab Results   Component Value Date    COLORU Yellow 02/26/2018    SPECGRAV 1.020 02/26/2018    NITRITE Negative  2018    KETONESU 1+ (A) 2018    UROBILINOGEN 1.0 2018    WBCUA 2 2018       Microbiology Data:  Blood and Urine Cx Pending    Other Laboratory Data:  Lactate 0.9  Tox negative  UA 1+ leuks, nitrite negative, 2 WBC, moderate bacteria, 1 squamous cell  FOBT +  AB.245/54/89/23.4 BE -4      Other Results:  EKG (my interpretation): sinus bradycardia    Radiology:  Imaging Results          X-Ray Chest AP Portable (Final result)  Result time 18 16:20:19    Final result by Omid Stevens MD (Timothy) (18 16:20:19)                 Impression:     Comparison 2018.  Interval placement of a left internal line.  Tip terminates near the confluence of the brachiocephalic veins.  No pneumothorax.    Tortuous thoracic aorta.  Moderate cardiomegaly.  Clear lungs.         Electronically signed by: OMID STEVENS MD  Date:     18  Time:    16:20              Narrative:    Chest, 1 view.    Clinical History: Central line placement                             CT Head Without Contrast (Final result)  Result time 18 13:10:00    Final result by Sav Littlejohn Jr., MD (18 13:10:00)                 Impression:     Enlargement of the ventricular system likely relates to central atrophy given the extensive white matter low density.  Communicating hydrocephalus is possible but less likely.    All CT scans at this facility use dose modulation, iterative reconstruction, and/or weight base dosing when appropriate to reduce radiation dose to as low as reasonably achievable.       Electronically signed by: SAV LITTLEJOHN M.D.  Date:     18  Time:    13:10              Narrative:    EXAM: [id number] HEAD WITHOUT CONTRAST    CLINICAL HISTORY: altered mental status     TECHNIQUE: Contiguous axial images were obtained from the skull base through the vertex without intravenous contrast.    COMPARISON: None.    FINDINGS: No intracranial hemorrhage. No mass effect or midline shift. No  extra axial fluid collections. There is low-density in the periventricular and deep white matter. The ventricular system is enlarged. The pineal region is unremarkable. The posterior fossa structures are grossly unremarkable within the limits of CT scan. The paranasal sinuses and mastoid air cells are clear. No fractures are identified. No concerning osseous lesions.                             X-Ray Chest 1 View (Final result)  Result time 02/26/18 12:12:11    Final result by Sav Littlejohn Jr., MD (02/26/18 12:12:11)                 Impression:     No acute pulmonary abnormality.  Cardiomegaly.        Electronically signed by: SAV LITTLEJOHN M.D.  Date:     02/26/18  Time:    12:12              Narrative:    XR CHEST 1 VIEW    Clinical history: Delirium.    Comparison: None available.    Findings: The cardiomediastinal silhouette is mildly enlarged. The lungs appear clear of active disease. There is no evidence of pneumonia, pulmonary edema, pleural effusion, pneumothorax or other acute disease.                                   Assessment:     Verna Carranza is a 78 y.o. female with:  Patient Active Problem List    Diagnosis Date Noted    Altered mental state 02/26/2018        Plan:     Undifferentiated Shock with Acute Encephalopathy  -patient with a few days of decreased mentation from baseline per family  -CT head shows enlargement of ventricular system likely 2/2 central atrophy with no acute abnormalities; acute stroke still in the differential given altered mental status, may consider MRI brain when patient more stable  -CXR negative for acute abnormality, however, lung sounds are course anteriorly, will get CT chest to r/o intrathoracic abnormality  -UA is nitrite negative but does have 1+ leuks, 2 WBC, moderate bacteria, may be potential source of infection  -initial abd exam  -WBC is wnl and initialy lactate was negative, however, patient is hypothermic, bradycardic and hypotensive and lactate now 3, so  suspect that septic shock is possible driving force; will cover broadly with vanc/zosyn; check procalcitonin; will check rapid flu, cover with tamiflu; blood and urine cultures pending  -CTAP for abdominal TTP on exam  -will treat with stress dose steroids (dex), check AM cortisol for possible adrenal insufficiency  -check TSH, free T4  -bedside echo difficult to visualize heart; troponins have been negative; lower suspicion for obstructive or cardiogenic shock at this time but will keep them in the differential; will trend troponins/EKGs; will order official TTE  -tox is another consideration given hx BB, trazodone and recent initiation of donepezil, however, per son, patient has been receiving meds correctly and only change was higher dose of trazodone; do not feel that this explains persistent shock  -patient started on dopamine and phenylephrine by [facility], central line placed; given suspicion for septic shock, will transition to levophed and titrate other pressors down as tolerated; a-line placed by anesthesia for close monitoring of BPs  -bear hugger and warmed IVF for hypothermia  -trend lactate, will give IVF    Acute Hypercapneic and Hypoxic Respiratory Failure  -initial ABG at [facility] 7.245/54/89/23.4; repeat ABG here 7.1/62.2/47/23  -given GCS 7 and expected clinical course, patient intubated by anesthesia  -initial vent settings AC with  FiO2 65% RR 16 PEEP 5, will recheck ABG in one hour  -consult pulm for assistance with vent management  -fentanyl gtt for sedation    AMY on CKD3  -baseline Cr 1.1, Cr today 1.27  -will check FENa, continue IVF in setting of shock    Transaminitis  -, , normal T. Bili  -checking CTAP, salicylate, acetaminophen levels  -possibly 2/2 shock?   -will trend    HTN  -on norvasc 5, toprol-xl 50 at home  -holding anti-hypertensives in setting of shock    Alzheimer's Dementia  -dementia is advanced as patient unable to perform ADLs, completely  dependent on her son, primary caretaker  -on donepezil 5mg daily, recently started; will hold in setting of altered mentation, bradycardia and shock    HLD  -on crestor 10mg daily  -check lipid panel; hold in setting of acute illness      Diet: NPO  DVT Prophylaxis: heparin  Dispo: admit to ICU, intubate for decline in GCS and expected clinical course, pending clinical improvement    Code Status:     FULL    Reyna Hager  [facility] Internal Medicine/Emergency Medicine -III  [facility] Hospital Medicine Service    [facility] Medicine Hospitalist Pager numbers:   [facility] Hospitalist Medicine Team A (Pepe/Nandini): [phone]  [facility] Hospitalist Medicine Team B (Maribell/Rhonda):  [phone]

## 2018-02-27 NOTE — ED NOTES
Spoke to Van in ICU - he stated Norman will be the nurse but he need to call me back for report (he's in the middle of a blood draw). Name and return number left.

## 2018-02-27 NOTE — ANESTHESIA PROCEDURE NOTES
Arterial    Diagnosis: acute resp fail / mech vent; shock, vasopressors    Patient location during procedure: ICU  Timeout: 2/26/2018 11:00 PM  Staffing  Anesthesiologist: JEAN ELKINS  Performed: anesthesiologist   Anesthesiologist was present at the time of the procedure.Arterial  Skin Prep: chlorhexidine gluconate  Local Infiltration: lidocaine  Orientation: left  Location: radial  Catheter Size: 22 G  Catheter placement by Ultrasound guidance. Heme positive aspiration all ports.  Vessel Caliber: small, patent, compressibility normal  Needle advanced into vessel with real time Ultrasound guidance.  Sterile sheath used.Insertion Attempts: 3  Assessment  Dressing: sutured in place and taped  Patient: Tolerated well

## 2018-02-27 NOTE — CONSULTS
[facility] Pulmonology and Critical Care Consult - Resident HO-3 Note    Primary Attending Physician: Skylar Delatorre  Primary Team: [facility] Hospital Medicine Team A  Consultant Attending: Tiffany Fonseca  Consultant Fellow: Pavan Cobb  Consultant Resident: Melanie Guerrero    Reason for Consult:     Vent Management    Subjective:      History of Present Illness:  Verna Carranza is a 78 y.o. woman who  has a past medical history of Alzheimer disease; Hyperlipidemia; and Hypertension.. The patient presented to the [facility] on 2/26/2018 with a primary complaint of Altered Mental Status (Pt arrival per EMS, EMS states family stated her MD increased her Trazadone from 50 mg to 100 mg nightly, family states pt has altered mental status this am, pt isn't talking, pt usually talks and walks.)     Ms. Gillespie requires help with  All ADLs. She was brought in for AMS by her son who provides all of her care at home. She recently started donepezil and increased dose of trazodone. Her son noted her sleeping more in the last 3-4 days.     In the ED, she was found to be bradycardic and then developed hypotension and hypothermia while being waiting to be transferred from [facility]. She was started on dopamine and phenylephrine.     This history was collected from the chart and primary team, as she is unable to provide her own history.     Past Medical History:  Past Medical History:   Diagnosis Date    Alzheimer disease     Hyperlipidemia     Hypertension        Past Surgical History:  History reviewed. No pertinent surgical history.    Allergies:  Review of patient's allergies indicates:  No Known Allergies    Medications:   In-Hospital Scheduled Medications:   dextrose 50 % in water (D50W)        dextrose 50%  25 g Intravenous Once    heparin (porcine)  5,000 Units Subcutaneous Q8H    oseltamivir  75 mg Oral BID    piperacillin-tazobactam (ZOSYN) IVPB  4.5 g Intravenous Q8H    vancomycin (VANCOCIN) IVPB  15 mg/kg Intravenous Q24H       "In-Hospital PRN Medications:  dextrose 50%, dextrose 50%, glucagon (human recombinant), glucose, glucose   In-Hospital IV Infusion Medications:   dextrose 5 % and 0.45 % NaCl with KCl 20 mEq      fentanyl 100 mcg/hr (18 2250)    norepinephrine bitartrate-D5W 0.3 mcg/kg/min (18 0600)    phenylephrine Stopped (18 06)      Home Medications:  Prior to Admission medications    Medication Sig Start Date End Date Taking? Authorizing Provider   amLODIPine (NORVASC) 5 MG tablet Take 5 mg by mouth once daily.   Yes Historical Provider, MD   metoprolol tartrate (LOPRESSOR) 100 MG tablet Take 50 mg by mouth 2 (two) times daily.   Yes Historical Provider, MD   aspirin 81 MG Chew Take 81 mg by mouth once daily.    Historical Provider, MD   lisinopril (PRINIVIL,ZESTRIL) 20 MG tablet Take 20 mg by mouth once daily.    Historical Provider, MD   memantine (NAMENDA) 10 MG Tab Take 5 mg by mouth 2 (two) times daily.    Historical Provider, MD   metoprolol succinate (TOPROL-XL) 50 MG 24 hr tablet Take 50 mg by mouth once daily.    Historical Provider, MD   ondansetron (ZOFRAN-ODT) 4 MG TbDL Take 1 tablet (4 mg total) by mouth every 8 (eight) hours as needed. 14   Viv Melgar MD   rosuvastatin (CRESTOR) 10 MG tablet Take 10 mg by mouth once daily.    Historical Provider, MD   trazodone (DESYREL) 100 MG tablet Take 150 mg by mouth every evening.     Historical Provider, MD       Family History:  History reviewed. No pertinent family history.    Social History:  Social History   Substance Use Topics    Smoking status: Never Smoker    Smokeless tobacco: Never Used    Alcohol use No       Review of Systems:  Unable to perform     Objective:   Last 24 Hour Vital Signs:  BP  Min: 73/44  Max: 164/79  Temp  Av.5 °F (34.2 °C)  Min: 89.8 °F (32.1 °C)  Max: 97.9 °F (36.6 °C)  Pulse  Av  Min: 36  Max: 83  Resp  Av.7  Min: 8  Max: 31  SpO2  Av.9 %  Min: 75 %  Max: 100 %  Height  Av' 2" " "(157.5 cm)  Min: 5' 2" (157.5 cm)  Max: 5' 2" (157.5 cm)  Weight  Av.9 kg (105 lb 8 oz)  Min: 45.8 kg (101 lb)  Max: 49.9 kg (110 lb)  I/O last 3 completed shifts:  In: 2985 [I.V.:1785; IV Piggyback:1200]  Out: 107 [Urine:107]    Physical Examination:  General: lying back, intubated and sedated, NAD  Head: normocephalic, atraumatic  Eyes: no scleral icterus, no conjunctival pallor, pupils 3mm with minimal reaction  Ears: no tenderness to palpation, no drainage appreciated  Nose: no tenderness to palpation, no drainage or erythema appreciated  Oral: ETT in place with OG   Resp: clear breath sounds anterolaterally, no crackles or wheeze  Cards: irregularly irregular, no murmurs  GI: soft, no grimace with palpation  : with Javier catheter in place  Ext: WWP, trace edema to bilateral feet  Skin: no rashes or lesions  Neuro: moves extremities spontaneously once during exam, did not respond to painful stimuli in extremities, legs pulled up and folded      Laboratory Results:    Trended Lab Data:    Recent Labs  Lab 18  1112 18  1700 18  2049 18  0605   WBC 10.44  --  9.13 10.92   HGB 12.5  --  12.6 11.1*   HCT 37.4 35* 38.2 32.6*   PLT 81*  --  71*  --    MCV 95  --  95 92   RDW 17.4*  --  16.8* 16.5*     --  138 137   K 4.7  --  3.7 3.7     --  106 110   CO2 30*  --  22* 18*   BUN 20*  --  19 19   GLU 97  --  218* 62*   PROT 6.9  --  6.8 5.4*   ALBUMIN 3.7  --  3.0* 2.4*   BILITOT 0.2  --  0.3 0.4   *  --  194* 113*   ALKPHOS 88  --  104 78   *  --  305* 216*     Other Results:  EKG (my interpretation):   Slow Atrial Fibrillation  ST changes with depression in various non-contiguous leads    Radiology:  X-ray Chest 1 View    Result Date: 2018  Chest one view Indication:Endotracheal tube placement Comparison:2018 Findings: Since the previous exam, endotracheal tube has been placed, tip lies approximately 3 cm above the lou.  There has been no significant " interval detrimental change in the cardiopulmonary status since the previous exam.  No pneumothorax.    As above Electronically signed by: FELIPE DE LEON MD Date:     02/26/18 Time:    21:34     X-ray Chest 1 View    Result Date: 2/26/2018  XR CHEST 1 VIEW Clinical history: Delirium. Comparison: None available. Findings: The cardiomediastinal silhouette is mildly enlarged. The lungs appear clear of active disease. There is no evidence of pneumonia, pulmonary edema, pleural effusion, pneumothorax or other acute disease.     No acute pulmonary abnormality.  Cardiomegaly.  Electronically signed by: BENNETT BEGUM M.D. Date:     02/26/18 Time:    12:12     Ct Head Without Contrast    Result Date: 2/26/2018  EXAM: [id number] HEAD WITHOUT CONTRAST CLINICAL HISTORY: altered mental status TECHNIQUE: Contiguous axial images were obtained from the skull base through the vertex without intravenous contrast. COMPARISON: None. FINDINGS: No intracranial hemorrhage. No mass effect or midline shift. No extra axial fluid collections. There is low-density in the periventricular and deep white matter. The ventricular system is enlarged. The pineal region is unremarkable. The posterior fossa structures are grossly unremarkable within the limits of CT scan. The paranasal sinuses and mastoid air cells are clear. No fractures are identified. No concerning osseous lesions.     Enlargement of the ventricular system likely relates to central atrophy given the extensive white matter low density.  Communicating hydrocephalus is possible but less likely. All CT scans at this facility use dose modulation, iterative reconstruction, and/or weight base dosing when appropriate to reduce radiation dose to as low as reasonably achievable. Electronically signed by: BENNETT BEGUM M.D. Date:     02/26/18 Time:    13:10     X-ray Chest Ap Portable    Result Date: 2/26/2018  Chest, 1 view. Clinical History: Central line placement     Comparison 02/26/2018.   Interval placement of a left internal line.  Tip terminates near the confluence of the brachiocephalic veins.  No pneumothorax. Tortuous thoracic aorta.  Moderate cardiomegaly.  Clear lungs.   Electronically signed by: BRIEN STEVENS MD Date:     02/26/18 Time:    16:20      Assessment:     Verna Carranza is a 78 y.o. female with:  Patient Active Problem List    Diagnosis Date Noted    Septic shock 02/27/2018    Acute respiratory failure with hypoxia and hypercapnia 02/27/2018    Acute encephalopathy 02/27/2018    Altered mental state 02/26/2018        Plan:     Acute Hypoxemic Hypercapnic Respiratory Failure in setting of Septic Shock of Unknown Source with Alzheimer's Disease  - CT chest not read but with finding in RLL  could be aspiration in setting of AMS  - agree with broad coverage and deescalate according to culture results  - requiring pressor support currently  with multiple end organ damage  - most recent ABG [id number] and 99% SpO2 on 55% FiO2  LTV would be 300mL  - titrate FiO2 for SpO2 >88%  will SAT/SBT daily   - will follow along for vent management  continue goals of care discussions      Thank you for allowing us to participate in the care of this patient. Please contact me at [phone] if you have any questions regarding this consult.      Melanie Guerrero  [employer] IM PGY 3  [facility] Pulmonology and Critical Care Team  [phone]

## 2018-02-27 NOTE — ANESTHESIA PROCEDURE NOTES
Intubation    Diagnosis: acute resp fail  Doctor requesting consult: icu  Patient location during procedure: ICU  Timeout: 2/26/2018 9:30 PM  Anesthesiologist was present at the time of the procedure.  Intubation  Indication: respiratory distress, respiratory failure  Induction: no Rx.  Intubation: preinduction-awake intubation, laryngoscopy direct utilizing bougie, Mike 3.  Endotracheal Tube: oral, 7.5 mm ID, cuffed (inflated to minimal occlusive pressure)  Attempts: 1, Grade III - only arytenoids seen  Complicating Factors: none  Tube secured at 22 cm  Findings post-intubation: bilateral breath sounds, positive ETCO2, atraumatic / condition of teeth unchanged  Position Confirmation: auscultation  Additional Notes  CXR ok

## 2018-02-28 PROBLEM — R56.9 SEIZURE: Status: ACTIVE | Noted: 2018-02-28

## 2018-02-28 LAB
ALBUMIN SERPL BCP-MCNC: 2 G/DL
ALP SERPL-CCNC: 56 U/L
ALT SERPL W/O P-5'-P-CCNC: 152 U/L
ANION GAP SERPL CALC-SCNC: 6 MMOL/L
AST SERPL-CCNC: 69 U/L
BASOPHILS # BLD AUTO: 0 K/UL
BASOPHILS NFR BLD: 0 %
BILIRUB SERPL-MCNC: 0.5 MG/DL
BUN SERPL-MCNC: 17 MG/DL
CALCIUM SERPL-MCNC: 8 MG/DL
CHLORIDE SERPL-SCNC: 109 MMOL/L
CO2 SERPL-SCNC: 18 MMOL/L
CREAT SERPL-MCNC: 1.2 MG/DL
DELSYS: ABNORMAL
DIFFERENTIAL METHOD: ABNORMAL
EOSINOPHIL # BLD AUTO: 0 K/UL
EOSINOPHIL NFR BLD: 0 %
ERYTHROCYTE [DISTWIDTH] IN BLOOD BY AUTOMATED COUNT: 17 %
ERYTHROCYTE [SEDIMENTATION RATE] IN BLOOD BY WESTERGREN METHOD: 16 MM/H
EST. GFR  (AFRICAN AMERICAN): 50 ML/MIN/1.73 M^2
EST. GFR  (NON AFRICAN AMERICAN): 43 ML/MIN/1.73 M^2
FIO2: 35
GLUCOSE SERPL-MCNC: 130 MG/DL
HCO3 UR-SCNC: 17.8 MMOL/L (ref 24–28)
HCT VFR BLD AUTO: 25.8 %
HGB BLD-MCNC: 8.9 G/DL
LYMPHOCYTES # BLD AUTO: 0.8 K/UL
LYMPHOCYTES NFR BLD: 5.3 %
MCH RBC QN AUTO: 32 PG
MCHC RBC AUTO-ENTMCNC: 34.5 G/DL
MCV RBC AUTO: 93 FL
MODE: ABNORMAL
MONOCYTES # BLD AUTO: 0.9 K/UL
MONOCYTES NFR BLD: 5.5 %
NEUTROPHILS # BLD AUTO: 13.9 K/UL
NEUTROPHILS NFR BLD: 89.2 %
PCO2 BLDA: 35.6 MMHG (ref 35–45)
PEEP: 5
PH SMN: 7.31 [PH] (ref 7.35–7.45)
PLATELET # BLD AUTO: 40 K/UL
PMV BLD AUTO: ABNORMAL FL
PO2 BLDA: 168 MMHG (ref 80–100)
POC BE: -8 MMOL/L
POC SATURATED O2: 99 % (ref 95–100)
POC TCO2: 19 MMOL/L (ref 23–27)
POCT GLUCOSE: 111 MG/DL (ref 70–110)
POCT GLUCOSE: 149 MG/DL (ref 70–110)
POCT GLUCOSE: 88 MG/DL (ref 70–110)
POCT GLUCOSE: 93 MG/DL (ref 70–110)
POTASSIUM SERPL-SCNC: 4.6 MMOL/L
PROT SERPL-MCNC: 4.6 G/DL
RBC # BLD AUTO: 2.78 M/UL
SAMPLE: ABNORMAL
SITE: ABNORMAL
SODIUM SERPL-SCNC: 133 MMOL/L
TROPONIN I SERPL DL<=0.01 NG/ML-MCNC: 0.23 NG/ML
VT: 350
WBC # BLD AUTO: 15.54 K/UL

## 2018-02-28 PROCEDURE — 63600175 PHARM REV CODE 636 W HCPCS: Performed by: STUDENT IN AN ORGANIZED HEALTH CARE EDUCATION/TRAINING PROGRAM

## 2018-02-28 PROCEDURE — 94003 VENT MGMT INPAT SUBQ DAY: CPT

## 2018-02-28 PROCEDURE — 84484 ASSAY OF TROPONIN QUANT: CPT

## 2018-02-28 PROCEDURE — 25000003 PHARM REV CODE 250: Performed by: INTERNAL MEDICINE

## 2018-02-28 PROCEDURE — 80053 COMPREHEN METABOLIC PANEL: CPT

## 2018-02-28 PROCEDURE — 85025 COMPLETE CBC W/AUTO DIFF WBC: CPT

## 2018-02-28 PROCEDURE — 20000000 HC ICU ROOM

## 2018-02-28 PROCEDURE — 25000003 PHARM REV CODE 250: Performed by: STUDENT IN AN ORGANIZED HEALTH CARE EDUCATION/TRAINING PROGRAM

## 2018-02-28 PROCEDURE — 95822 EEG COMA OR SLEEP ONLY: CPT

## 2018-02-28 PROCEDURE — 95816 EEG AWAKE AND DROWSY: CPT | Mod: 26,,, | Performed by: PSYCHIATRY & NEUROLOGY

## 2018-02-28 PROCEDURE — 63600175 PHARM REV CODE 636 W HCPCS: Performed by: INTERNAL MEDICINE

## 2018-02-28 PROCEDURE — S0028 INJECTION, FAMOTIDINE, 20 MG: HCPCS | Performed by: INTERNAL MEDICINE

## 2018-02-28 RX ORDER — OSELTAMIVIR PHOSPHATE 6 MG/ML
30 FOR SUSPENSION ORAL 2 TIMES DAILY
Status: DISPENSED | OUTPATIENT
Start: 2018-02-28 | End: 2018-03-04

## 2018-02-28 RX ORDER — LEVETIRACETAM 10 MG/ML
1000 INJECTION INTRAVASCULAR EVERY 12 HOURS
Status: DISCONTINUED | OUTPATIENT
Start: 2018-02-28 | End: 2018-03-01

## 2018-02-28 RX ADMIN — DEXTROSE MONOHYDRATE, SODIUM CHLORIDE, AND POTASSIUM CHLORIDE: 50; 4.5; 1.49 INJECTION, SOLUTION INTRAVENOUS at 12:02

## 2018-02-28 RX ADMIN — OSELTAMIVIR PHOSPHATE 30 MG: 6 POWDER, FOR SUSPENSION ORAL at 09:02

## 2018-02-28 RX ADMIN — LEVETIRACETAM 1000 MG: 1000 INJECTION, SOLUTION INTRAVENOUS at 10:02

## 2018-02-28 RX ADMIN — PIPERACILLIN AND TAZOBACTAM 4.5 G: 4; .5 INJECTION, POWDER, LYOPHILIZED, FOR SOLUTION INTRAVENOUS; PARENTERAL at 11:02

## 2018-02-28 RX ADMIN — HEPARIN SODIUM 5000 UNITS: 5000 INJECTION, SOLUTION INTRAVENOUS; SUBCUTANEOUS at 05:02

## 2018-02-28 RX ADMIN — VANCOMYCIN HYDROCHLORIDE 750 MG: 750 INJECTION, POWDER, LYOPHILIZED, FOR SOLUTION INTRAVENOUS at 08:02

## 2018-02-28 RX ADMIN — PIPERACILLIN AND TAZOBACTAM 4.5 G: 4; .5 INJECTION, POWDER, LYOPHILIZED, FOR SOLUTION INTRAVENOUS; PARENTERAL at 06:02

## 2018-02-28 RX ADMIN — FAMOTIDINE 20 MG: 10 INJECTION INTRAVENOUS at 08:02

## 2018-02-28 RX ADMIN — LEVETIRACETAM 1000 MG: 1000 INJECTION, SOLUTION INTRAVENOUS at 08:02

## 2018-02-28 RX ADMIN — Medication: at 04:02

## 2018-02-28 RX ADMIN — PIPERACILLIN AND TAZOBACTAM 4.5 G: 4; .5 INJECTION, POWDER, LYOPHILIZED, FOR SOLUTION INTRAVENOUS; PARENTERAL at 03:02

## 2018-02-28 NOTE — PROGRESS NOTES
Pt intubated and sedated; seizure activity noted this am.Pt's son , Saloni, at bedside and updated on status and plan of care.  Tn to continue to follow.

## 2018-02-28 NOTE — MEDICAL/APP STUDENT
Pulmonary / Critical Care Medicine  Progress Note  S: Verna Carranza is a 77 y/o woman on mechanical ventilation for septic shock.  This morning at 7:43 nursing staff witnessed her eyes roll back and subsequent rigidity in all four limbs for 1 minute.      Vitals at the time were: , /110, glucose 149       O: VS: Temp:  [94.6 °F (34.8 °C)-98.5 °F (36.9 °C)]   Pulse:  [51-99]   Resp:  [6-29]   BP: ()/(55-84)   SpO2:  [100 %]   Arterial Line BP: ()/(51-88)     I/O:   Intake/Output Summary (Last 24 hours) at 02/28/18 0820  Last data filed at 02/28/18 0800   Gross per 24 hour   Intake          3718.14 ml   Output              420 ml   Net          3298.14 ml       Vent: Mode / Rate 16 /  / PEEP 5 / FiO2 35%    PE Physical Exam   Constitutional:   Cachectic     HENT:   Head: Normocephalic and atraumatic.   Eyes:   Pinpoint pupils     Neck: No tracheal deviation present. No thyromegaly present.   Pulmonary/Chest: She has no wheezes. She has no rales.   Musculoskeletal: She exhibits no deformity.   Neurological:   Sedated; cannot follow commands but is sometimes responsive to touch   Skin: Skin is warm and dry.            Labs   Recent Labs  Lab 02/27/18  1145 02/28/18  0500   WBC  --  15.54*   RBC  --  2.78*   HGB  --  8.9*   HCT  --  25.8*   PLT  --  40*   MCV  --  93   MCH  --  32.0*   MCHC  --  34.5   NA  --  133*   K  --  4.6   CL  --  109   CO2  --  18*   BUN  --  17   CREATININE  --  1.2   ALT  --  152*   AST  --  69*   ALKPHOS  --  56   BILITOT  --  0.5   PROT  --  4.6*   ALBUMIN  --  2.0*   CPK 56  --    TROPONINI 0.251*  0.251*  0.251*  --               Micro   Blood Cultures: no growth  Sputum Culture: no growth      Medications   Scheduled    dextrose 50%  25 g Intravenous Once    famotidine (PF)  20 mg Intravenous Daily    heparin (porcine)  5,000 Units Subcutaneous Q8H    oseltamivir  30 mg Oral Daily    piperacillin-tazobactam (ZOSYN) IVPB  4.5 g Intravenous Q8H     vancomycin (VANCOCIN) IVPB  15 mg/kg Intravenous Q48H      Continuous Infusions:    dextrose 5 % and 0.45 % NaCl with KCl 20 mEq 125 mL/hr at 02/28/18 0800    fentanyl 5 mL/hr at 02/28/18 0800    norepinephrine bitartrate-D5W Stopped (02/28/18 0015)        PRN   dextrose 50%, dextrose 50%, glucagon (human recombinant), glucose, glucose, lorazepam                 A/P:    NEURO  Acute Seizure  · Patient sedated with   · Patient suffered an acute generalized tonic seizure for 1 minute this morning  · Possibility of a cerebral event  · Consider CT with and without contrast  Alzheimer's dementia  · Patient could not perform ADLs prior to hospital stay  · With rapid deterioration consider hospice care    RESPIRATORY  Acute Hypoxemic Hypercapnic Respiratory Failure in setting of Septic Shock  · Patient is saturating well on ventilation and afebrile     CARDIAC  Atrial fibrillation with slow ventricular response  · Troponin remains elevated since 2/27  · Consider rhythm control, amiodarone?    RENAL/  · No significant growth on urine culture (2/26)  · Creatinine is wnl    HEME/ID  Right lower lobe consolidation  · Patient remain afebrile  · Patient covered with vanc, pip/tazo    GASTRO/NUTRITION  Malnutrition  · Patient is cachectic  · Possible cause of seizure  · Ask family for history of seizures    FEN/METABOLIC  Hyperglycemia  · Likely not the cause of her seizure  · Continue to watch    Prophylaxis: heparin dtt      Code status:  Full    DISPO: Patient is in critical condition.  Still requires ICU level care at this time.    Nigel Mendez  [facility] Medical Student, M3  02/28/2018  8:20 AM

## 2018-02-28 NOTE — PLAN OF CARE
Problem: Patient Care Overview  Goal: Plan of Care Review  Outcome: Ongoing (interventions implemented as appropriate)  POC reviewed with the pt's family members. Levo and Fent gtts continued. Levo titrated for MAP >65. Vent weaned to FiO2 30% for sats >92%. Pt moves 4 extrem, but does not follow commands. Pt turned and repositioned q2h prn, as well as bed set to lateral rotation. Complete bath, gown, and linen change. Refer to flowsheets for vitals, gtts, and assessments. Will continue to monitor.

## 2018-02-28 NOTE — PROGRESS NOTES
"[facility] Internal Medicine Resident HOMaryamI Progress Note    Subjective:      Verna Carranza is a 78 y.o.  female who is being followed by the [facility] service at [facility] for septic shock.     Patient remains intubated and sedated with soft restraints in place. Nurses able to wean off levophed ggt overnight with blood pressures remaining stable. This morning pt was reported to have upward gaze with jerk like movements lasting for about 1 min. PRN Ativan ordered for repeat seizure activity lasting >1min. No other acute concerns overnight/     Objective:   Last 24 Hour Vital Signs:  BP  Min: 97/59  Max: 179/84  Temp  Av.3 °F (36.3 °C)  Min: 94.6 °F (34.8 °C)  Max: 98.5 °F (36.9 °C)  Pulse  Av.6  Min: 51  Max: 99  Resp  Av.6  Min: 6  Max: 29  SpO2  Av %  Min: 100 %  Max: 100 %  Height  Av' 2" (157.5 cm)  Min: 5' 2" (157.5 cm)  Max: 5' 2" (157.5 cm)  Weight  Av.5 kg (148 lb 13 oz)  Min: 67.5 kg (148 lb 13 oz)  Max: 67.5 kg (148 lb 13 oz)  I/O last 3 completed shifts:  In: 5703.2 [I.V.:5303.2; IV Piggyback:400]  Out: 512 [Urine:512]    Physical Examination:  /65   Pulse 65   Temp 97.3 °F (36.3 °C) (Oral)   Resp 17   Ht 5' 2" (1.575 m)   Wt 67.5 kg (148 lb 13 oz)   SpO2 100%   BMI 27.22 kg/m²   General appearance: intubated and sedated on fentanyl gtt  Head: Normocephalic, without obvious abnormality, atraumatic  Lungs: course breath sounds bilaterally anteriorly, satting 100% on vent  Heart: regular rate and rythm, normal S1, S2, no murmurs  Abdomen: soft, non-tender; bowel sounds normal; no masses,  no organomegaly  Extremities: extremities normal, atraumatic, no cyanosis or edema  Pulses: 2+ and symmetric  Neurologic: pupils pinpoint, +gag, GCS 6T (E1V1M4), moving all extremities in response to pain     Laboratory:  Laboratory Data Reviewed: yes  Pertinent Findings:    Recent Labs  Lab 18  2049 18  0605 18  0500   WBC 9.13 10.92 " 15.54*   HGB 12.6 11.1* 8.9*   HCT 38.2 32.6* 25.8*   PLT 71* 69* 40*   MCV 95 92 93   RDW 16.8* 16.5* 17.0*    137 133*   K 3.7 3.7 4.6    110 109   CO2 22* 18* 18*   BUN 19 19 17   CREATININE 1.4 1.3 1.2   * 62* 130*   PROT 6.8 5.4* 4.6*   ALBUMIN 3.0* 2.4* 2.0*   BILITOT 0.3 0.4 0.5   * 113* 69*   ALKPHOS 104 78 56   * 216* 152*     Microbiology Data Reviewed: yes  Pertinent Findings:  Rapid flu negative  Blood cx NGTD  Urine cx pending    Other Results:      Radiology Data Reviewed: yes  Pertinent Findings:  CT chest and CTAP w/o contrast 2/27: large consolidation in RLL, distended GB, possible colonic wall thickening/colitis (limited eval without contrast)    Current Medications:     Infusions:   dextrose 5 % and 0.45 % NaCl with KCl 20 mEq 125 mL/hr at 02/28/18 0700    fentanyl 5 mL/hr at 02/28/18 0715    norepinephrine bitartrate-D5W Stopped (02/28/18 0015)        Scheduled:   dextrose 50%  25 g Intravenous Once    famotidine (PF)  20 mg Intravenous Daily    heparin (porcine)  5,000 Units Subcutaneous Q8H    oseltamivir  30 mg Oral Daily    piperacillin-tazobactam (ZOSYN) IVPB  4.5 g Intravenous Q8H    vancomycin (VANCOCIN) IVPB  15 mg/kg Intravenous Q48H        PRN:      Antibiotics and Day Number of Therapy:  Vanc/Zosyn/Tamiflu (started 2/27) Day 3    Lines and Day Number of Therapy:  [facility] (2/26)  PIV    Assessment:     Verna Carranza is a 78 y.o.female with  Patient Active Problem List    Diagnosis Date Noted    Septic shock 02/27/2018    Acute respiratory failure with hypoxia and hypercapnia 02/27/2018    Acute encephalopathy 02/27/2018    Pneumonia of right lower lobe due to infectious organism 02/27/2018    AMY (acute kidney injury) 02/27/2018    CKD (chronic kidney disease), stage III 02/27/2018    Transaminitis 02/27/2018    Elevated troponin 02/27/2018    Advanced dementia 02/27/2018    Bradycardia     Altered mental state 02/26/2018        Plan:      Septic Shock with RLL Pneumonia  -patient with a few days of decreased mentation from baseline per family  -patient was hypothermic, bradycardic and hypotensive on admission with end organ damage (lactic acidosis, abnormal troponin, AMY)  -CXR negative for acute abnormality, however, lung sounds are course anteriorly, CT chest obtained which shows RLL pneumonia, likely aspiration 2/2 decreased mental status  -TTE shows no wall motion abnormalities, concentric hypertrophy, normal LVEF, normal RV systolic function, pulmonary hypertension (PA pressure 43), mild AR, mild MR, mild TR; do not feel that patient's shock is cardiogenic or obstructive in nature given TTE results  -UA is nitrite negative but does have 1+ leuks, 2 WBC, moderate bacteria, may be additional potential source of infection, will f/u urine culture  -CTAP w/possible colitis  -given multiple possible sources of infection, covering broadly with abx as well as with tamiflu (although flu negative)  -given one-time dose of dexamethasone, ordered stim test to r/o adrenal insufficiency - cortisol wnl  - TSH, free T4 normal  -patient started on dopamine and phenylephrine by [facility], central line placed; given suspicion for septic shock, transitioned to levophed monotherapy overnight, which was weaned as tolerated. Patient now off levophed. A-line placed by anesthesia for close monitoring of Bps  -bear hugger and warmed IVF for hypothermia, which has improved; patient now doing better after improvement of temp   -family wishes to pursue all resuscitative measures at this point, will continue to have goals of care discussion with the family given patient's advanced dementia and overall poor prognosis    Acute Encephalopathy  -CT head shows enlargement of ventricular system likely 2/2 central atrophy with no acute abnormalities; acute stroke still in the differential given altered mental status, may consider MRI brain and EEG when patient more  stable   -tox is another consideration given hx BB, trazodone and recent initiation of donepezil, however, per son, patient has been receiving meds correctly and only change was higher dose of trazodone    Seizure Activity  - Patient with 2 reported seizures (rhythmic tonic clonic activity) this morning, both self-resolved and were less than 1 minute in duration  - EEG and repeat CT head ordered stat. Keppra load given and [facility] - neurology consulted.  - PRN ativan ordered as needed. Will continue to monitor closely.    Acute Hypercapneic and Hypoxic Respiratory Failure  -likely 2/2 acute encephalopathy as well as RLL pneumonia  -initial ABG at [facility] 7.245/54/89/23.4; repeat ABG here 7.1/62.2/47/23  -given GCS 7 and expected clinical course, patient intubated by anesthesia  -initial vent settings AC with  FiO2 65% RR 16 PEEP 5, titrating down FiO2 as tolerated  -pulm consulted for assistance with vent management; weaning Fi02 as tolerated   -fentanyl gtt for sedation     AMY on CKD3  -baseline Cr 1.1, Cr 1.27 on admission  -FENa 0.2% c/w pre-renal etiology, continuing IVF in setting of shock     Transaminitis, improving  -, , normal T. Bili on admission; trending down this AM  -CTAP with no explanation; acetaminopen/salicylate wnl  -possibly 2/2 shock vs. tox (trazodone cleared by liver)  -will trend, improving daily    Thrombocytopenia  -plts 69 today, levels were normal last in 2015  -likely 2/2 acute illness; no signs of active bleeding, will monitor closely  -daily cbc    Normocytic Anemia  - H/H 12.6/38.2 on admission, MCV 95  - H/H 8.8/25.8 on AM labs (2/28), likely dilutional given no reports of GI or urinary blood loss   - No acute concerns, will continue to monitor closey     HTN  -on norvasc 5, toprol-xl 50 at home  -holding anti-hypertensives in setting of shock     Alzheimer's Dementia  -dementia is advanced as patient unable to perform ADLs, completely dependent on her son,  primary caretaker  -on donepezil 5mg daily, recently started; will hold in setting of altered mentation, bradycardia and shock     HLD  -on crestor 10mg daily  -check lipid panel; hold in setting of acute illness     Diet: NPO  DVT Prophylaxis: heparin  Code Status: Full  Dispo: continue ICU level of care, pending clinical improvement and goals of care discussion with family given patient's overall poor prognosis    Mis Slade  [facility] Internal Medicine HO-III  [facility] Internal Medicine Service Team A    [facility] Medicine Hospitalist Pager numbers:   [facility] Hospitalist Medicine Team A (Pepe/Nandini): [phone]  [facility] Hospitalist Medicine Team B (Maribell/Rhonda):  [phone]

## 2018-02-28 NOTE — PROGRESS NOTES
Nursing student and instructor in room assessing pt, and noted pt with VS changes and pt making jerking movements.   I noted elevated HR, BP, sats still 100%. Pt with upward gaze, jerk like movements, lasted about 1 min. Paged Dr. Delatorre's team and spoke with Dr. Keita. Orders received to give Ativan if another seizure is noted that lasts >1min.   Currently VSS, return to baseline. Will continue to monitor.

## 2018-02-28 NOTE — PLAN OF CARE
Problem: Patient Care Overview  Goal: Plan of Care Review  Outcome: Ongoing (interventions implemented as appropriate)  Pt lying comfortably in bed, calm, withdrawn, not responding to commands, withdraws to pain. Respirations even and unlabored. SATS 100% on AC 16/350/5/35%. BP stable--levophed weaned off. Tele sinus lee-NSR with HR 50's-60's. Hypothermic at beginning of shift--normothermia obtained with heating blanket. Urine output 5-30ml/hr. No acute events overnight. Will continue to monitor.

## 2018-02-28 NOTE — PROGRESS NOTES
Pt noted to have another episode of seizure-like activity.  and SBP 180s. Sats 100%. Dr. Cobb at bedside. Ativan obtained, not given r/t pt out of seizure like state.

## 2018-02-28 NOTE — PROGRESS NOTES
Plan of care reviewed with the pt and her family. Pt made a DNR after Dr. Cobb discussed plan, goals, of care, and prognosis with pt's son and other medical POA. EEG and CT of head done. Levo gtt remains off. Fent and MIVF d/c'd. Urine output low. Pt tunred and repositioned q2h prn, no skin breakdown noted. Refer to flowsheets for vitals, gtts, and assessments. Will continue to monitor.

## 2018-02-28 NOTE — PROGRESS NOTES
"[facility] Resident HO-II Progress Note - Pulmonology Service    Subjective:      Verna Carranza is a 78 y.o. female who is being followed by the [facility] Pulmonology service at [facility] for acute hypoxemic and hypercapnic respiratory failure and septic shock. Patient weaned off levophed overnight.  This morning patient had ~2 minute seizure, per nursing report.  Low urine output.     Objective:   Last 24 Hour Vital Signs:  BP  Min: 97/59  Max: 179/84  Temp  Av.3 °F (36.3 °C)  Min: 94.6 °F (34.8 °C)  Max: 98.5 °F (36.9 °C)  Pulse  Av.1  Min: 51  Max: 75  Resp  Av.5  Min: 6  Max: 29  SpO2  Av %  Min: 100 %  Max: 100 %  Height  Av' 2" (157.5 cm)  Min: 5' 2" (157.5 cm)  Max: 5' 2" (157.5 cm)  Weight  Av.5 kg (148 lb 13 oz)  Min: 67.5 kg (148 lb 13 oz)  Max: 67.5 kg (148 lb 13 oz)  I/O last 3 completed shifts:  In: 5703.2 [I.V.:5303.2; IV Piggyback:400]  Out: 512 [Urine:512]    Physical Examination:  General: intubated, sedated on fentanyl  HEENT: pinpoint pupils, anicteric sclera, normal conjunctiva, MMM, ETT in place  Neck: supple, no thyromegaly  CV: RRR, no murmur  Resp: CTAB, no wheezes or rales  Abd: soft, NT, ND, normoactive BS  : Javier in place with minimal yellow urine  Extr: WWP, normal ROM, keeps legs drawn up  Neuro: withdraws from pain, upward gazing gaze, does not follow commands      Laboratory:  Laboratory Data:     Recent Labs  Lab 18  2049 18  0605 18  0500   WBC 9.13 10.92 15.54*   HGB 12.6 11.1* 8.9*   HCT 38.2 32.6* 25.8*   PLT 71* 69* 40*   MCV 95 92 93   RDW 16.8* 16.5* 17.0*    137 133*   K 3.7 3.7 4.6    110 109   CO2 22* 18* 18*   BUN 19 19 17   CREATININE 1.4 1.3 1.2   * 62* 130*   PROT 6.8 5.4* 4.6*   ALBUMIN 3.0* 2.4* 2.0*   BILITOT 0.3 0.4 0.5   * 113* 69*   ALKPHOS 104 78 56   * 216* 152*     Microbiology Data:   BCx () NGTD  UCx () NG  Resp cx () gram stain rare WBC, no " organisms    Other Results:  Radiology Data:   CT Chest (2/26) - large RLL consolidation  CT Abd/Pelvis (2/27) - distended gallbladder, possible colon wall thickening    Current Medications:     Infusions:   dextrose 5 % and 0.45 % NaCl with KCl 20 mEq 125 mL/hr at 02/28/18 0700    fentanyl 5 mL/hr at 02/28/18 0700    norepinephrine bitartrate-D5W Stopped (02/28/18 0015)        Scheduled:   dextrose 50%  25 g Intravenous Once    famotidine (PF)  20 mg Intravenous Daily    heparin (porcine)  5,000 Units Subcutaneous Q8H    oseltamivir  30 mg Oral Daily    piperacillin-tazobactam (ZOSYN) IVPB  4.5 g Intravenous Q8H    vancomycin (VANCOCIN) IVPB  15 mg/kg Intravenous Q48H        PRN:  dextrose 50%, dextrose 50%, glucagon (human recombinant), glucose, glucose    Antibiotics and Day Number of Therapy:  Vanc/Zosyn (Day 2)    Lines and Day Number of Therapy:  L radial art line  LIJ triple lumen CVC  L and R PIV  OGT  Javier  ETT    Assessment:     Verna Carranza is a 78 y.o.female with  Patient Active Problem List    Diagnosis Date Noted    Septic shock 02/27/2018    Acute respiratory failure with hypoxia and hypercapnia 02/27/2018    Acute encephalopathy 02/27/2018    Pneumonia of right lower lobe due to infectious organism 02/27/2018    AMY (acute kidney injury) 02/27/2018    CKD (chronic kidney disease), stage III 02/27/2018    Transaminitis 02/27/2018    Elevated troponin 02/27/2018    Advanced dementia 02/27/2018    Bradycardia     Altered mental state 02/26/2018     Plan:     Acute Respiratory Failure with Hypoxia and Hypercapnia 2/2 Aspiration Pneumonia  - continue broad spectrum antibiotics, as patient without much improvement  - weaned off pressor support, remains intubated due to mental status  - continue ventilator support per [employer] protocol  weaning FiO2 with SBT daily  - consider preceding event of aspiration, now with seizure activity.  Recommend head imaging and EEG.  - continue goals  of care discussions with family    Reyna Gomez  \[facility]\"" Internal Medicine HO-II  \[facility]\"" Pulmonology Service

## 2018-02-28 NOTE — PLAN OF CARE
Discussed patient with [facility] medicine re: seizure like activity this morning. No prior hx of seizures and presently on no AED. Recommended they consider loading with broad spectrum AED such as Keppra and obtaining a repeat image of the brain to assess for change, CT vs MRI. Consider also obtaining spot EEG. May be indicative of new structural brain damage or recent ischemia vs effect of sepsis.    Pavan Cobb MD  [facility] Pulmonary and Critical Care Fellow  Pager: [phone]  Cell: [phone]

## 2018-02-28 NOTE — CONSULTS
"NEUROLOGY FLOOR CONSULT    Reason for consult: Seizures     Informant: Family       Other sources of information : ICU nurse student, IM resident, chart review    CC:  "n/a"    HPI:  Mrs. Carranza is 79 y/o AA female with PMHx of advanced Alzheimer Dementia (started 10 years ago), HTN and HLD who was transferred from [facility] with septic shock and hypoxic respiratory failure secondary to RLL aspiration pneumonia. Neurology team was called after two witnessed seizure like activity where patient had upward gaze, BL UE flexion with jerking movements lasted for 1 min.    Per chart review, patient was in her baseline until 5-6 days ago, baseline for her is (speaks inappropriate words, unable to perform ADLs, eats regular foods (no need to be fed) and takes her medications (when son given them to her). Son noticed that patient was sleeping more than usual. He denied noticing fevers, chills, URI like sx. Another son visit Mrs. Dillon jasmine on 2/26/18 to check on her and found her with decreased mentation from her baseline and completely nonverbal, then patient was brought to [facility]. Initially patient was found bradycardic (40's) and normothermic, then she developed hypotension and was started on dopamine and phenylephrine.   Today patient was seen and examined at bedside, she remains intubated but off of sedation. Corneal and gag present and GCS was 7T.      ROS: Unable to assess    Histories:     Allergies:  Patient has no known allergies.    Current Medications:    Current Facility-Administered Medications   Medication Dose Route Frequency Provider Last Rate Last Dose    dextrose 50% injection 12.5 g  12.5 g Intravenous PRN Mis Slade MD   12.5 g at 02/27/18 0734    dextrose 50% injection 25 g  25 g Intravenous Once Reyna Hager MD        dextrose 50% injection 25 g  25 g Intravenous PRN Mis Slade MD        famotidine (PF) injection 20 mg  20 mg Intravenous Daily " Skylar Delatorre MD   20 mg at 02/28/18 0848    glucagon (human recombinant) injection 1 mg  1 mg Intramuscular PRN Mis Slade MD        glucose chewable tablet 16 g  16 g Oral PRN Mis Slade MD        glucose chewable tablet 24 g  24 g Oral PRN Mis Slade MD        levETIRAcetam in NaCl (iso-os) IVPB 1,000 mg  1,000 mg Intravenous Q12H [facility]        lorazepam (ATIVAN) injection 1 mg  1 mg Intravenous Q1H PRN Skylar Delatorre MD   Stopped at 02/28/18 0943    norepinephrine bitartrate-D5W 16 mg/250 mL (64 mcg/mL) infusion  0.02 mcg/kg/min Intravenous Continuous Reyna Hager MD   Stopped at 02/28/18 0015    oseltamivir 6 mg/mL 30 mg  30 mg Oral Daily Skylar Delatorre MD   30 mg at 02/27/18 2200    piperacillin-tazobactam 4.5 g in dextrose 5 % 100 mL IVPB (ready to mix system)  4.5 g Intravenous Q8H Reyna Hager MD 25 mL/hr at 02/28/18 0607 4.5 g at 02/28/18 0607    vancomycin (VANCOCIN) 750 mg in sodium chloride 0.9% 250 mL IVPB  15 mg/kg Intravenous Q48H Skylar Delatorre MD           Past Medical/Surgical/Family History:  Medical:   Past Medical History:   Diagnosis Date    Alzheimer disease     Hyperlipidemia     Hypertension       Home medications:   Amlodipine 5mg daily  Rosuvastatin 10mg daily  Toprol-XL 50mg daily  ASA 81mg daily  Donepezil 10mg daily  Trazodone 150mg nightly      Surgeries: No known surgical history per family  Family: Unable to assess    Social History:    Substance Abuse/Dependence History:  Tobacco: Never smoker  EtOH: None  Ilicits: None    Occupational/Employment History:  Occupation: None    Current Evaluation:     Vital Signs:   Vitals:    02/28/18 1000   BP: 106/61   Pulse: 65   Resp: 16   Temp:       General: Patient intubated, off of sedation, soft restraints in place.   HEENT: AT/NC, EEG leads in place  Lungs: Course breath sound BL  Heart: RRR  Abd: ND  Ext: No edema    Neurological Exam    CGS: 7T (E2V1M4)  Mental Status:  Patient intubated     Language:  Unable to assess    Cranial Nerves:  Visual fields : Unable to assess  EOM: Unable to assess  Pupills:~ 1 mm Bl, ?reactives  V1-V3: Unable to assess  No facial asymmetry was seen  Hearing, palate elevation, and tongue at midline: Unable to assess  Reflexes: Corneal and gag were present    Motor:  Withdraws to pain in 4 extremities  Muscle tone: increased   No involuntary movements were noted    DTRs:  UE unable to examined 2/2 IV placed  Patellar and achilles: 0  Extension plantar response  No sanchez sign appreciated    Sensation:  Light touch, vibration and proprioception: Unable to assess    Cerebellar:  Unable to assess    Gait and Stand:  Unable to assess      LABORATORY STUDIES:  Recent Results (from the past 24 hour(s))   POCT glucose    Collection Time: 02/27/18 11:05 AM   Result Value Ref Range    POCT Glucose 189 (H) 70 - 110 mg/dL   2D echo with color flow doppler    Collection Time: 02/27/18 11:10 AM   Result Value Ref Range    EF 65 55 - 65    Mitral Valve Regurgitation TRIVIAL TO MILD     Aortic Valve Regurgitation MILD     Est. PA Systolic Pressure 43.05 (A)     Tricuspid Valve Regurgitation MILD    Vancomycin, random    Collection Time: 02/27/18 11:45 AM   Result Value Ref Range    Vancomycin, Random 9.9 Not established ug/mL   Troponin I    Collection Time: 02/27/18 11:45 AM   Result Value Ref Range    Troponin I 0.251 (H) 0.000 - 0.026 ng/mL   CK    Collection Time: 02/27/18 11:45 AM   Result Value Ref Range    CPK 56 20 - 180 U/L   Troponin I    Collection Time: 02/27/18 11:45 AM   Result Value Ref Range    Troponin I 0.251 (H) 0.000 - 0.026 ng/mL   Troponin I    Collection Time: 02/27/18 11:45 AM   Result Value Ref Range    Troponin I 0.251 (H) 0.000 - 0.026 ng/mL   Lactic acid, plasma    Collection Time: 02/27/18  1:50 PM   Result Value Ref Range    Lactate (Lactic Acid) 1.7 0.5 - 2.2 mmol/L   POCT glucose    Collection Time:  02/27/18  2:06 PM   Result Value Ref Range    POCT Glucose 157 (H) 70 - 110 mg/dL   Culture, Respiratory with Gram Stain    Collection Time: 02/27/18  2:13 PM   Result Value Ref Range    Gram Stain (Respiratory) <10 epithelial cells per low power field.     Gram Stain (Respiratory) Rare WBC's     Gram Stain (Respiratory) No organisms seen    POCT glucose    Collection Time: 02/27/18  6:31 PM   Result Value Ref Range    POCT Glucose 148 (H) 70 - 110 mg/dL   CBC auto differential    Collection Time: 02/28/18  5:00 AM   Result Value Ref Range    WBC 15.54 (H) 3.90 - 12.70 K/uL    RBC 2.78 (L) 4.00 - 5.40 M/uL    Hemoglobin 8.9 (L) 12.0 - 16.0 g/dL    Hematocrit 25.8 (L) 37.0 - 48.5 %    MCV 93 82 - 98 fL    MCH 32.0 (H) 27.0 - 31.0 pg    MCHC 34.5 32.0 - 36.0 g/dL    RDW 17.0 (H) 11.5 - 14.5 %    Platelets 40 (L) 150 - 350 K/uL    MPV SEE COMMENT 9.2 - 12.9 fL    Gran # (ANC) 13.9 (H) 1.8 - 7.7 K/uL    Lymph # 0.8 (L) 1.0 - 4.8 K/uL    Mono # 0.9 0.3 - 1.0 K/uL    Eos # 0.0 0.0 - 0.5 K/uL    Baso # 0.00 0.00 - 0.20 K/uL    Gran% 89.2 (H) 38.0 - 73.0 %    Lymph% 5.3 (L) 18.0 - 48.0 %    Mono% 5.5 4.0 - 15.0 %    Eosinophil% 0.0 0.0 - 8.0 %    Basophil% 0.0 0.0 - 1.9 %    Differential Method Automated    Comprehensive metabolic panel    Collection Time: 02/28/18  5:00 AM   Result Value Ref Range    Sodium 133 (L) 136 - 145 mmol/L    Potassium 4.6 3.5 - 5.1 mmol/L    Chloride 109 95 - 110 mmol/L    CO2 18 (L) 23 - 29 mmol/L    Glucose 130 (H) 70 - 110 mg/dL    BUN, Bld 17 8 - 23 mg/dL    Creatinine 1.2 0.5 - 1.4 mg/dL    Calcium 8.0 (L) 8.7 - 10.5 mg/dL    Total Protein 4.6 (L) 6.0 - 8.4 g/dL    Albumin 2.0 (L) 3.5 - 5.2 g/dL    Total Bilirubin 0.5 0.1 - 1.0 mg/dL    Alkaline Phosphatase 56 55 - 135 U/L    AST 69 (H) 10 - 40 U/L     (H) 10 - 44 U/L    Anion Gap 6 (L) 8 - 16 mmol/L    eGFR if African American 50 (A) >60 mL/min/1.73 m^2    eGFR if non African American 43 (A) >60 mL/min/1.73 m^2   POCT glucose     "Collection Time: 02/28/18  7:52 AM   Result Value Ref Range    POCT Glucose 149 (H) 70 - 110 mg/dL   Troponin I    Collection Time: 02/28/18  8:27 AM   Result Value Ref Range    Troponin I 0.228 (H) 0.000 - 0.026 ng/mL   ISTAT PROCEDURE    Collection Time: 02/28/18  9:39 AM   Result Value Ref Range    POC PH 7.308 (L) 7.35 - 7.45    POC PCO2 35.6 35 - 45 mmHg    POC PO2 168 (H) 80 - 100 mmHg    POC HCO3 17.8 (L) 24 - 28 mmol/L    POC BE -8 -2 to 2 mmol/L    POC SATURATED O2 99 95 - 100 %    POC TCO2 19 (L) 23 - 27 mmol/L    Rate 16     Sample ARTERIAL     Site Mariely/UAC     DelSys Adult Vent     Mode AC/PRVC     Vt 350     PEEP 5     FiO2 35        RADIOLOGY STUDIES:  I have personally reviewed the images performed.     HEAD CT: Ventriculomegaly noted with mainly temporal-parietal atrophy            Assessment:  P     Mrs. Carranza is 77 y/o AA female with PMHx of advanced Alzheimer Dementia (started 10 years ago), HTN and HLD who was transferred from [facility] with septic shock and hypoxic respiratory failure secondary to RLL aspiration pneumonia. Neurology team was called after two witnessed seizure like activity where patient had upward gaze, BL UE flexion with jerking movements lasted for 1 min.   Patient remains intubated and CGS 7T  /72   Pulse 65   Temp 97.3 °F (36.3 °C) (Oral)   Resp 16   Ht 5' 2" (1.575 m)   Wt 67.5 kg (148 lb 13 oz)   SpO2 100%   BMI 27.22 kg/m²       1. AMS with two seizures like activity  -Seizure activity could be secondary to systemic d/o. High in the differential are also acute/subacute neurologic insult like stroke, SAH/ICH, cerebral anoxia. Patient AMS could be also 2/2 to NCSE.  -Neuro physical exam: CGS: 7T (E2V1M4), pupils ~ 1 mm, ?reactives, corneal and gag reflexes present. Babinski sign noted. Four extremities withdraws to pain.   -Vitals stable, afebrile (had one episode of hypothermia), MAP: >65   -Labs today: WBC trending up, normocytic anemia with h/h " trending down (occult blood positive), thrombocytopenia. Hyponatremia (133), HCO3-18, BUN/Cr WNL (GFR: 50), transaminitis improving, Hyperglycemia, hypoalbuminemia, Ca correction: 9.6, Ammonia WNL. NL lactate, NL procalcitonin and TSH: 1.157. Hepatitis panel: Negative  -Micro: Bcx: NGTD, Respiratory culture: No organisms seen and Ucx: NGTD (UA NL)   -Drug screen: Negative  -Echo with concentric hypertrophy, LV EF (>55%). Pulmonary HTN   -CT chest: Large area of consolidation in the RLL. Also possible colitis  -Head Ct done on 02/26: Central atrophy with enlargement of ventricular system. No hypoattenuation or hemorrhagic insult seen in this study  -Recommend ideally MRI Brain w/o contrast, or instead CT head  -V-EEG running now, will f/u  -On Vancomycin and Zosyn  -Received Levetiracetam 1 gr at 10:42 am (2/28/18). Continue with Keppra 1 gr Qam and Qpm  -Ativan 1 mg Q6Hr PRN for seizure like activity  -Deferred to primary team infection w/u and treatment and correction of electrolytes derangements  -Avoid drugs induced coma if possible      Case discussed with Dr. Pisano      Will follow for additional input regarding management of current neurologic condition and monitor for any new signs/symptoms to suggest neurologic detrimental changes.     Appreciate the consult.     HODAN Myrick MD  [facility] Neurology PGY-2

## 2018-02-28 NOTE — PLAN OF CARE
Problem: Patient Care Overview  Goal: Plan of Care Review  Outcome: Ongoing (interventions implemented as appropriate)  Patient on  with documented settings.  Alarms are set and functioning with adequate volumes.  AMBU bag and mask at bedside.

## 2018-02-28 NOTE — PLAN OF CARE
I had a long conversation with both the son (ngoc) and the sister and MPOA Amina Stafford ([phone]) regarding current condition, new seizure-like activity and treatment plans. They both agree that the patient would not want a prolonged dependence of mechanical ventilation or tube feedings. They would not want her to receive chest compressions or defibrillation in the event of cardiac arrest. We also discussed the risk of respiratory failure after extubation (likely would be related to neurologic status > pulmonary disease) and have decided that we would attempt to optimize her as best as possible before any possible extubation -- at that time there would not be a plan to re-intubate as this would very likely result in her requiring long term assisted ventilation and likely trach which the family deems as unacceptable.    I have updated the EMR to reflect this change in code status    Pavan Cobb MD  [facility] Pul[facility] Care Fellow  Pager: [phone]  Cell: [phone]

## 2018-03-01 PROBLEM — R57.9 SHOCK: Status: ACTIVE | Noted: 2018-03-01

## 2018-03-01 PROBLEM — E87.20 ACIDOSIS: Status: ACTIVE | Noted: 2018-03-01

## 2018-03-01 LAB
ABO + RH BLD: NORMAL
ALBUMIN SERPL BCP-MCNC: 2 G/DL
ALP SERPL-CCNC: 42 U/L
ALT SERPL W/O P-5'-P-CCNC: 131 U/L
ANION GAP SERPL CALC-SCNC: 5 MMOL/L
ANISOCYTOSIS BLD QL SMEAR: SLIGHT
APTT BLDCRRT: 38.3 SEC
AST SERPL-CCNC: 67 U/L
BASOPHILS # BLD AUTO: 0 K/UL
BASOPHILS # BLD AUTO: 0 K/UL
BASOPHILS # BLD AUTO: ABNORMAL K/UL
BASOPHILS NFR BLD: 0 %
BILIRUB SERPL-MCNC: 0.8 MG/DL
BLD GP AB SCN CELLS X3 SERPL QL: NORMAL
BLD PROD TYP BPU: NORMAL
BLOOD UNIT EXPIRATION DATE: NORMAL
BLOOD UNIT TYPE CODE: 1700
BLOOD UNIT TYPE CODE: 1700
BLOOD UNIT TYPE CODE: 6200
BLOOD UNIT TYPE: NORMAL
BUN SERPL-MCNC: 18 MG/DL
BURR CELLS BLD QL SMEAR: ABNORMAL
CALCIUM SERPL-MCNC: 8.1 MG/DL
CHLORIDE SERPL-SCNC: 112 MMOL/L
CO2 SERPL-SCNC: 18 MMOL/L
CODING SYSTEM: NORMAL
CREAT SERPL-MCNC: 1.3 MG/DL
D DIMER PPP IA.FEU-MCNC: 1.86 MG/L FEU
DIFFERENTIAL METHOD: ABNORMAL
DISPENSE STATUS: NORMAL
EOSINOPHIL # BLD AUTO: 0.1 K/UL
EOSINOPHIL # BLD AUTO: 0.1 K/UL
EOSINOPHIL # BLD AUTO: ABNORMAL K/UL
EOSINOPHIL NFR BLD: 0.4 %
EOSINOPHIL NFR BLD: 0.6 %
EOSINOPHIL NFR BLD: 2 %
ERYTHROCYTE [DISTWIDTH] IN BLOOD BY AUTOMATED COUNT: 15.8 %
ERYTHROCYTE [DISTWIDTH] IN BLOOD BY AUTOMATED COUNT: 17 %
ERYTHROCYTE [DISTWIDTH] IN BLOOD BY AUTOMATED COUNT: 17.1 %
ERYTHROCYTE [DISTWIDTH] IN BLOOD BY AUTOMATED COUNT: 17.1 %
EST. GFR  (AFRICAN AMERICAN): 45 ML/MIN/1.73 M^2
EST. GFR  (NON AFRICAN AMERICAN): 39 ML/MIN/1.73 M^2
FIBRINOGEN PPP-MCNC: 282 MG/DL
GLUCOSE SERPL-MCNC: 90 MG/DL
HAPTOGLOB SERPL-MCNC: 69 MG/DL
HCT VFR BLD AUTO: 17.5 %
HCT VFR BLD AUTO: 17.5 %
HCT VFR BLD AUTO: 18.7 %
HCT VFR BLD AUTO: 27 %
HGB BLD-MCNC: 6 G/DL
HGB BLD-MCNC: 6 G/DL
HGB BLD-MCNC: 6.4 G/DL
HGB BLD-MCNC: 9.4 G/DL
HYPOCHROMIA BLD QL SMEAR: ABNORMAL
INR PPP: 1.1
LDH SERPL L TO P-CCNC: 237 U/L
LYMPHOCYTES # BLD AUTO: 1.4 K/UL
LYMPHOCYTES # BLD AUTO: 1.8 K/UL
LYMPHOCYTES # BLD AUTO: ABNORMAL K/UL
LYMPHOCYTES NFR BLD: 10.7 %
LYMPHOCYTES NFR BLD: 13.3 %
LYMPHOCYTES NFR BLD: 8 %
MCH RBC QN AUTO: 30.4 PG
MCH RBC QN AUTO: 31.7 PG
MCHC RBC AUTO-ENTMCNC: 34.2 G/DL
MCHC RBC AUTO-ENTMCNC: 34.3 G/DL
MCHC RBC AUTO-ENTMCNC: 34.3 G/DL
MCHC RBC AUTO-ENTMCNC: 34.8 G/DL
MCV RBC AUTO: 87 FL
MCV RBC AUTO: 93 FL
MONOCYTES # BLD AUTO: 0.9 K/UL
MONOCYTES # BLD AUTO: 0.9 K/UL
MONOCYTES # BLD AUTO: ABNORMAL K/UL
MONOCYTES NFR BLD: 2 %
MONOCYTES NFR BLD: 6.6 %
MONOCYTES NFR BLD: 6.9 %
NEUTROPHILS # BLD AUTO: 10.3 K/UL
NEUTROPHILS # BLD AUTO: 10.7 K/UL
NEUTROPHILS NFR BLD: 79.4 %
NEUTROPHILS NFR BLD: 81.5 %
NEUTROPHILS NFR BLD: 83 %
NEUTS BAND NFR BLD MANUAL: 5 %
NRBC BLD-RTO: ABNORMAL /100 WBC
PLATELET # BLD AUTO: 33 K/UL
PLATELET # BLD AUTO: 38 K/UL
PLATELET # BLD AUTO: 38 K/UL
PLATELET # BLD AUTO: 99 K/UL
PLATELET BLD QL SMEAR: ABNORMAL
PMV BLD AUTO: 10.6 FL
PMV BLD AUTO: ABNORMAL FL
POCT GLUCOSE: 112 MG/DL (ref 70–110)
POCT GLUCOSE: 57 MG/DL (ref 70–110)
POCT GLUCOSE: 62 MG/DL (ref 70–110)
POCT GLUCOSE: 85 MG/DL (ref 70–110)
POCT GLUCOSE: 87 MG/DL (ref 70–110)
POCT GLUCOSE: 89 MG/DL (ref 70–110)
POIKILOCYTOSIS BLD QL SMEAR: SLIGHT
POLYCHROMASIA BLD QL SMEAR: ABNORMAL
POTASSIUM SERPL-SCNC: 4.5 MMOL/L
PROT SERPL-MCNC: 4.2 G/DL
PROTHROMBIN TIME: 12 SEC
RBC # BLD AUTO: 1.89 M/UL
RBC # BLD AUTO: 1.89 M/UL
RBC # BLD AUTO: 2.02 M/UL
RBC # BLD AUTO: 3.09 M/UL
SODIUM SERPL-SCNC: 135 MMOL/L
TRANS ERYTHROCYTES VOL PATIENT: NORMAL ML
TRANS ERYTHROCYTES VOL PATIENT: NORMAL ML
TRANS PLATPHERESIS VOL PATIENT: NORMAL ML
WBC # BLD AUTO: 12.59 K/UL
WBC # BLD AUTO: 12.59 K/UL
WBC # BLD AUTO: 13.4 K/UL
WBC # BLD AUTO: 13.65 K/UL

## 2018-03-01 PROCEDURE — 94761 N-INVAS EAR/PLS OXIMETRY MLT: CPT

## 2018-03-01 PROCEDURE — 94003 VENT MGMT INPAT SUBQ DAY: CPT

## 2018-03-01 PROCEDURE — 63600175 PHARM REV CODE 636 W HCPCS: Performed by: STUDENT IN AN ORGANIZED HEALTH CARE EDUCATION/TRAINING PROGRAM

## 2018-03-01 PROCEDURE — P9021 RED BLOOD CELLS UNIT: HCPCS

## 2018-03-01 PROCEDURE — 85379 FIBRIN DEGRADATION QUANT: CPT

## 2018-03-01 PROCEDURE — 85027 COMPLETE CBC AUTOMATED: CPT

## 2018-03-01 PROCEDURE — 85384 FIBRINOGEN ACTIVITY: CPT

## 2018-03-01 PROCEDURE — 83010 ASSAY OF HAPTOGLOBIN QUANT: CPT

## 2018-03-01 PROCEDURE — 80053 COMPREHEN METABOLIC PANEL: CPT

## 2018-03-01 PROCEDURE — S0028 INJECTION, FAMOTIDINE, 20 MG: HCPCS | Performed by: INTERNAL MEDICINE

## 2018-03-01 PROCEDURE — 25000003 PHARM REV CODE 250: Performed by: STUDENT IN AN ORGANIZED HEALTH CARE EDUCATION/TRAINING PROGRAM

## 2018-03-01 PROCEDURE — 86920 COMPATIBILITY TEST SPIN: CPT

## 2018-03-01 PROCEDURE — C9113 INJ PANTOPRAZOLE SODIUM, VIA: HCPCS | Performed by: STUDENT IN AN ORGANIZED HEALTH CARE EDUCATION/TRAINING PROGRAM

## 2018-03-01 PROCEDURE — 36415 COLL VENOUS BLD VENIPUNCTURE: CPT

## 2018-03-01 PROCEDURE — 63600175 PHARM REV CODE 636 W HCPCS: Performed by: INTERNAL MEDICINE

## 2018-03-01 PROCEDURE — 85610 PROTHROMBIN TIME: CPT

## 2018-03-01 PROCEDURE — 85730 THROMBOPLASTIN TIME PARTIAL: CPT

## 2018-03-01 PROCEDURE — 85007 BL SMEAR W/DIFF WBC COUNT: CPT | Mod: 91

## 2018-03-01 PROCEDURE — 25000003 PHARM REV CODE 250: Performed by: INTERNAL MEDICINE

## 2018-03-01 PROCEDURE — 27200966 HC CLOSED SUCTION SYSTEM

## 2018-03-01 PROCEDURE — 36430 TRANSFUSION BLD/BLD COMPNT: CPT

## 2018-03-01 PROCEDURE — 83615 LACTATE (LD) (LDH) ENZYME: CPT

## 2018-03-01 PROCEDURE — 27000221 HC OXYGEN, UP TO 24 HOURS

## 2018-03-01 PROCEDURE — 85025 COMPLETE CBC W/AUTO DIFF WBC: CPT

## 2018-03-01 PROCEDURE — P9035 PLATELET PHERES LEUKOREDUCED: HCPCS

## 2018-03-01 PROCEDURE — 86901 BLOOD TYPING SEROLOGIC RH(D): CPT

## 2018-03-01 PROCEDURE — 20000000 HC ICU ROOM

## 2018-03-01 RX ORDER — MORPHINE SULFATE 4 MG/ML
2 INJECTION, SOLUTION INTRAMUSCULAR; INTRAVENOUS ONCE
Status: COMPLETED | OUTPATIENT
Start: 2018-03-01 | End: 2018-03-01

## 2018-03-01 RX ORDER — HYDROCODONE BITARTRATE AND ACETAMINOPHEN 500; 5 MG/1; MG/1
TABLET ORAL
Status: DISCONTINUED | OUTPATIENT
Start: 2018-03-01 | End: 2018-03-06 | Stop reason: HOSPADM

## 2018-03-01 RX ORDER — LEVETIRACETAM 10 MG/ML
1000 INJECTION INTRAVASCULAR ONCE
Status: COMPLETED | OUTPATIENT
Start: 2018-03-01 | End: 2018-03-01

## 2018-03-01 RX ORDER — PANTOPRAZOLE SODIUM 40 MG/10ML
40 INJECTION, POWDER, LYOPHILIZED, FOR SOLUTION INTRAVENOUS 2 TIMES DAILY
Status: DISCONTINUED | OUTPATIENT
Start: 2018-03-01 | End: 2018-03-06 | Stop reason: HOSPADM

## 2018-03-01 RX ORDER — LEVETIRACETAM 15 MG/ML
1500 INJECTION INTRAVASCULAR EVERY 12 HOURS
Status: DISCONTINUED | OUTPATIENT
Start: 2018-03-01 | End: 2018-03-06 | Stop reason: HOSPADM

## 2018-03-01 RX ORDER — FAMOTIDINE 10 MG/ML
20 INJECTION INTRAVENOUS DAILY
Status: DISCONTINUED | OUTPATIENT
Start: 2018-03-01 | End: 2018-03-01

## 2018-03-01 RX ORDER — FAMOTIDINE 10 MG/ML
20 INJECTION INTRAVENOUS 2 TIMES DAILY
Status: DISCONTINUED | OUTPATIENT
Start: 2018-03-01 | End: 2018-03-01 | Stop reason: DRUGHIGH

## 2018-03-01 RX ADMIN — OSELTAMIVIR PHOSPHATE 30 MG: 6 POWDER, FOR SUSPENSION ORAL at 09:03

## 2018-03-01 RX ADMIN — LEVETIRACETAM 1000 MG: 1000 INJECTION, SOLUTION INTRAVENOUS at 09:03

## 2018-03-01 RX ADMIN — MORPHINE SULFATE 2 MG: 4 INJECTION INTRAVENOUS at 12:03

## 2018-03-01 RX ADMIN — DEXTROSE MONOHYDRATE 12.5 G: 500 INJECTION PARENTERAL at 06:03

## 2018-03-01 RX ADMIN — FAMOTIDINE 20 MG: 10 INJECTION INTRAVENOUS at 01:03

## 2018-03-01 RX ADMIN — PIPERACILLIN AND TAZOBACTAM 4.5 G: 4; .5 INJECTION, POWDER, LYOPHILIZED, FOR SOLUTION INTRAVENOUS; PARENTERAL at 06:03

## 2018-03-01 RX ADMIN — PIPERACILLIN AND TAZOBACTAM 4.5 G: 4; .5 INJECTION, POWDER, LYOPHILIZED, FOR SOLUTION INTRAVENOUS; PARENTERAL at 10:03

## 2018-03-01 RX ADMIN — OSELTAMIVIR PHOSPHATE 30 MG: 6 POWDER, FOR SUSPENSION ORAL at 08:03

## 2018-03-01 RX ADMIN — PANTOPRAZOLE SODIUM 40 MG: 40 INJECTION, POWDER, FOR SOLUTION INTRAVENOUS at 08:03

## 2018-03-01 RX ADMIN — PIPERACILLIN AND TAZOBACTAM 4.5 G: 4; .5 INJECTION, POWDER, LYOPHILIZED, FOR SOLUTION INTRAVENOUS; PARENTERAL at 02:03

## 2018-03-01 RX ADMIN — FAMOTIDINE 20 MG: 10 INJECTION INTRAVENOUS at 09:03

## 2018-03-01 RX ADMIN — LEVETIRACETAM 1500 MG: 15 INJECTION INTRAVENOUS at 08:03

## 2018-03-01 RX ADMIN — LEVETIRACETAM 1000 MG: 10 INJECTION INTRAVENOUS at 12:03

## 2018-03-01 NOTE — PROGRESS NOTES
Pharmacist Renal Dose Adjustment Note    Verna Carranza is a 78 y.o. female being treated with the medication Famotidine    Patient Data:    Vital Signs (Most Recent):  Temp: 97.3 °F (36.3 °C) (03/01/18 1100)  Pulse: 80 (03/01/18 1200)  Resp: 16 (03/01/18 1200)  BP: 112/72 (03/01/18 1200)  SpO2: 100 % (03/01/18 1200) Vital Signs (72h Range):  Temp:  [89.8 °F (32.1 °C)-98.5 °F (36.9 °C)]   Pulse:  []   Resp:  [6-31]   BP: ()/(44-93)   SpO2:  [75 %-100 %]   Arterial Line BP: ()/()        Recent Labs     Lab 02/27/18  0605 02/28/18  0500 03/01/18  0435   CREATININE 1.3 1.2 1.3     Serum creatinine: 1.3 mg/dL 03/01/18 0435  Estimated creatinine clearance: 32.1 mL/min    Medication:famotidine dose: 20 mg frequency BID  will be changed to medication:Famotidine dose:20 mg  frequency:Daily    Pharmacist's Name: Evelyn Garza  Pharmacist's Extension: [phone]

## 2018-03-01 NOTE — PROGRESS NOTES
"[facility] Resident HO-II Progress Note - Pulmonology Service    Subjective:      Verna Carranza is a 78 y.o. female who is being followed by the [facility] Pulmonology service at [facility] for acute hypoxemic and hypercapnic respiratory failure 2/2 aspiration. Nursing reports "alot" of bleeding from LIJ CVC site, catheter removed overnight and pressure applied with resolution of bleeding.  Patient more alert and occasionally appears uncomfortable; currently in wrist restraints to prevent removal of lines.  Nursing reports new seizure activity this morning lasting ~1 minute.     Objective:   Last 24 Hour Vital Signs:  BP  Min: 97/58  Max: 129/78  Temp  Av.4 °F (36.3 °C)  Min: 97.2 °F (36.2 °C)  Max: 97.6 °F (36.4 °C)  Pulse  Av.9  Min: 61  Max: 140  Resp  Av.6  Min: 11  Max: 28  SpO2  Av %  Min: 100 %  Max: 100 %  I/O last 3 completed shifts:  In: 2463.1 [I.V.:1563.1; NG/GT:50; IV Piggyback:850]  Out: 1080 [Urine:530; Drains:550]    Physical Examination:  General: intubated, awake, mild discomfort 2/2 ETT and restraints  HEENT: PERRL, anicteric sclera, normal conjunctiva, MMM, ETT in place  Neck: supple, no thyromegaly, LIJ removed and dressing c/d/i  CV: RRR, no murmur  Resp: CTAB, no wheezes or rales  Abd: soft, NT, ND, normoactive BS, OGT with green/brown drainage, ecchymosis over lateral abdomen  : Javier in place with yellow urine  Extr: WWP, normal ROM, keeps legs drawn up  Skin: intact, no rashes or lesions  Neuro: opens eyes spontaneously, does not follow commands, nonverbal, moves extremities spontaneously      Laboratory:  Laboratory Data:     Recent Labs  Lab 18  0605 18  0500 18  0435 18  0630   WBC 10.92 15.54*  --  13.40*   HGB 11.1* 8.9*  --  6.4*   HCT 32.6* 25.8*  --  18.7*   PLT 69* 40*  --  33*   MCV 92 93  --  93   RDW 16.5* 17.0*  --  17.0*    133* 135*  --    K 3.7 4.6 4.5  --     109 112*  --    CO2 18* 18* 18*  --    BUN " 19 17 18  --    CREATININE 1.3 1.2 1.3  --    GLU 62* 130* 90  --    PROT 5.4* 4.6* 4.2*  --    ALBUMIN 2.4* 2.0* 2.0*  --    BILITOT 0.4 0.5 0.8  --    * 69* 67*  --    ALKPHOS 78 56 42*  --    * 152* 131*  --      Microbiology Data:   BCx (2/26) NGTD  UCx (2/26) NG  Resp cx (2/27) gram stain rare WBC, no organisms    Other Results:  Radiology Data:   CT Chest (2/26) - large RLL consolidation  CT Abd/Pelvis (2/27) - distended gallbladder, possible colon wall thickening    Current Medications:     Infusions:       Scheduled:   dextrose 50%  25 g Intravenous Once    famotidine (PF)  20 mg Intravenous Daily    levetiracetam IVPB  1,000 mg Intravenous Q12H    oseltamivir  30 mg Oral BID    piperacillin-tazobactam (ZOSYN) IVPB  4.5 g Intravenous Q8H    vancomycin (VANCOCIN) IVPB  15 mg/kg Intravenous Q48H        PRN:  sodium chloride, sodium chloride, dextrose 50%, dextrose 50%, glucagon (human recombinant), glucose, glucose, lorazepam    Antibiotics and Day Number of Therapy:  Vanc/Zosyn (Day 3)    Lines and Day Number of Therapy:  L radial art line  L and R PIV  OGT  Javier  ETT    Assessment:     Verna Carranza is a 78 y.o.female with  Patient Active Problem List    Diagnosis Date Noted    Seizure 02/28/2018    Septic shock 02/27/2018    Acute respiratory failure with hypoxia and hypercapnia 02/27/2018    Acute encephalopathy 02/27/2018    Pneumonia of right lower lobe due to infectious organism 02/27/2018    AMY (acute kidney injury) 02/27/2018    CKD (chronic kidney disease), stage III 02/27/2018    Transaminitis 02/27/2018    Elevated troponin 02/27/2018    Advanced dementia 02/27/2018    Bradycardia     Altered mental state 02/26/2018     Plan:     Acute Respiratory Failure with Hypoxia and Hypercapnia 2/2 Aspiration Pneumonia  - continue broad spectrum abx with vanc/zosyn  - SBT today failed  will discuss with family prior to any extubation, as plan to not re-intubate  - to remain  with ventilatory support today due to overall clinical condition    Thrombocytopenia, Anemia with active blood loss  - ordered coags, fibrinogen, D-dimer, LDH, haptoglobin to assess for DIC/TTP  - type/cross for 2 units RBC and 1 dose platelets  consents obtained  - recommend assessment of retroperitoneum for signs of bleed    Seizure  - consider keppra load due to seizure recurrence and possible increased dose  - further recommendations per Neurology    Reyna Gomez  [facility] HO-II  [facility] Pulmonology Service

## 2018-03-01 NOTE — PROCEDURES
DATE OF STUDY:  02/28/2018.  ICU EEG/VIDEO MONITORING REPORT  METHODOLOGY:  Electroencephalographic (EEG) is recorded with electrodes placed   according to the International 10-20 placement system.  Thirty two (32) channels   of digital signal (sampling rate of 512/sec), including T1 and T2, were   simultaneously recorded from the scalp and may include EKG, EMG, and/or eye   monitors.  Recording band pass was 0.1 to 512 Hz.  Digital video recording of   the patient is simultaneously recorded with the EEG.  The patient is instructed   to report clinical symptoms which may occur during the recording session.  EEG   and video recording are stored and archived in digital format.  Activation   procedures, which include photic stimulation, hyperventilation and instructing   patients to perform simple tasks, are done in selected patients.  The EEG is displayed on a monitor screen and can be reviewed using different   montages.  Computer-assisted analysis is employed to detect spike and   electrographic seizure activity.   The entire record is submitted for computer   analysis.  The entire recording is visually reviewed, and the times identified   by computer analysis as being spikes or seizures are reviewed again.    Compressed spectral analysis (CSA) is also performed on the activity recorded   from each individual channel.  This is displayed as a power display of   frequencies from 0 to 30 Hz over time.   The CSA is reviewed looking for   asymmetries in power between homologous areas of the scalp, then compared with   the original EEG recording.    [employer] software was also utilized in the review of this study.  This software   suite analyzes the EEG recording in multiple domains.  Coherence and rhythmicity   are computed to identify EEG sections which may contain organized seizures.    Each channel undergoes analysis to detect the presence of spike and sharp waves   which have special and morphological characteristics  of epileptic activity.  The   routine EEG recording is converted from special into frequency domain.  This is   then displayed comparing homologous areas to identify areas of significant   asymmetry.  Algorithm to identify non-cortically generated artifact is used to   separate artifact from the EEG.   Duration was 30 minutes and 59 seconds.    EEG FINDINGS:  This record is diffusely slow and suppressed with low amplitude   delta and superimposed alpha activity being most prevalent frequencies.  There   is a very light burst-suppression pattern evident at times.  There are scattered   frontal theta frequencies in the 5 to 7 Hz range, occasionally seen as well.    The record is symmetrical.  There is little variability throughout the study.    There are no epileptiform discharges.  There are no seizures.  There are no   focal findings.    INTERPRETATION:  Abnormal EEG due to moderately diffuse slowing and suppression   of the cortical background.  Record is symmetric.  There were no focal areas of   dysfunction and no evidence for seizures.      NBB/IN  dd: 02/28/2018 12:39:34 (CST)  td: 02/28/2018 23:39:57 (CST)  Doc ID   #[id number]  Job ID #[id number]    CC:

## 2018-03-01 NOTE — PROGRESS NOTES
Pt placed on CPAP by RRT. Noted that pt's HR and BP elevated. Pt with eyes upward, eyelid twitching, and body movements. RRT placed pt back on A/C on vent and then pt noted to return to baseline. Labs pending. Dr. Win notified and will come to bedside.

## 2018-03-01 NOTE — PROGRESS NOTES
[facility] Resident HO-III Progress Note    Subjective:      Concern for bleeding from CVC site overnight.  Remains hemodynamically stable.  Awake and alert with concern for myoclonic activity this morning     Objective:   Last 24 Hour Vital Signs:  BP  Min: 97/58  Max: 129/78  Temp  Av.4 °F (36.3 °C)  Min: 97.2 °F (36.2 °C)  Max: 97.6 °F (36.4 °C)  Pulse  Av  Min: 61  Max: 140  Resp  Av.7  Min: 11  Max: 28  SpO2  Av %  Min: 100 %  Max: 100 %  I/O last 3 completed shifts:  In: 2463.1 [I.V.:1563.1; NG/GT:50; IV Piggyback:850]  Out: 1105 [Urine:555; Drains:550]    Physical Examination:  General: Alert and awake.  Not following commands  HENT:  NCAT; anicteric sclera; ETT and OGT in place.    Cardio:  Regular rate and rhythm with normal S1 and S2; no murmurs  Resp:  Mechanical breath sounds bilaterally.  No crackles or wheezess  Abdom: Soft, NTND with normoactive bowel sounds  Extrem: WWP with no edema  Pulses: 2+ and symmetric distally  Neuro:  Awake.  Not following commands. Non-purposeful movements of BUE.  Pupils 2mm bilaterlly      Laboratory:  Laboratory Data Reviewed: yes  Pertinent Findings:  Lab Results   Component Value Date    WBC 12.59 2018    WBC 12.59 2018    HGB 6.0 (L) 2018    HGB 6.0 (L) 2018    HCT 17.5 (LL) 2018    HCT 17.5 (LL) 2018    MCV 93 2018    MCV 93 2018    PLT 38 (LL) 2018    PLT 38 (LL) 2018     Lab Results   Component Value Date    CREATININE 1.3 2018    BUN 18 2018     (L) 2018    K 4.5 2018     (H) 2018    CO2 18 (L) 2018     Lab Results   Component Value Date     (H) 2018    AST 67 (H) 2018    ALKPHOS 42 (L) 2018    BILITOT 0.8 2018       Microbiology Data Reviewed: yes  Pertinent Findings:   Urine Cx -- NGTD   Blood Cx x 2 -- NGTD   Resp Cx -- normal edwin    Other Results:  EKG (my interpretation):   -  NO NEW ECG    Radiology Data Reviewed: yes  Pertinent Findings:  2/28 CT head:  Stable cerebral atrophy    Current Medications:     Infusions:       Scheduled:   dextrose 50%  25 g Intravenous Once    famotidine (PF)  20 mg Intravenous Daily    levetiracetam IVPB  1,000 mg Intravenous Q12H    oseltamivir  30 mg Oral BID    piperacillin-tazobactam (ZOSYN) IVPB  4.5 g Intravenous Q8H    vancomycin (VANCOCIN) IVPB  15 mg/kg Intravenous Q48H        PRN:  sodium chloride, sodium chloride, dextrose 50%, dextrose 50%, glucagon (human recombinant), glucose, glucose, lorazepam      Assessment:     Verna Carranza is a 78 y.o.female with     Plan:     Neuro:  - alert and awake off sedation.  Non-purposeful movements, not following commands  - Neuro consulted.  CT head with stable cerebral atrophy  - EEG without focal seizure, diffuse slowing.  Increasing keppra to 1500 BID  - Above findings are concerning for poor prognosis, kameron insetting of baseline dementia and prolonged ICU stay    CVS:  - hemodynamically stable off pressors.   ECHO with DD and PHTN    Resp:  - on vent without sedation.  No dyssynchrony.  Continue to wean as tolerated    FEN/GI:  - hyponatremic/hypochloremic today.  MIVF stopped.  Continue aggressive electrolyte repletion as needed    Heme/ID:  - remains afebrile.  WBC remains elevated.  UCx, BCx, RCx all NGTD  - continue empiric tamiflu, Vanc and Zosyn  - Hgb 9>>6.4 -- ransfusing 2U RBC.  PLT 33 this AM -- transfusing PLT  - DIC labs pending.  PLT low on admit, now with concern for CVC bleeding.  CVC removed    Renal:  - Cr remains stable.  Avoiding nephrotoxins    Endo:  - no acute issues    CODE:  DNR  VTE PPx:  None 2/2 bleeding and thrombocytopenia     Disposition:  Pending ongoing ICU care and GOC discussion with family  .  Lj Hawkins  [facility] HO-III  [facility] Medicine Service Team A    [facility] Medicine Hospitalist Pager numbers:   [facility] Hospitalist Medicine Team A  (Pepe/Nandini): [phone]  [facility] Hospitalist Medicine Team B (Mairbell/Rhonda):  [phone]

## 2018-03-01 NOTE — PLAN OF CARE
Problem: Patient Care Overview  Goal: Plan of Care Review  Outcome: Ongoing (interventions implemented as appropriate)  Pt lying comfortably in bed, calm, withdrawn, not responding to commands, withdraws to pain. Respirations even and unlabored. SATS 100% on AC 16/350/5/30%. BP stable--levophed remains off. Tele NSR with HR 80's. Normothermic. Urine output 0-10ml/hr. No seizure activity noted during night. Pt had large amount of bleeding from IJ x2. Attempted to stop bleeding by holding pressure for 5 minutes and applying surgicel. MD notified--Ok to discontinue IJ. Will continue to monitor.

## 2018-03-02 LAB
ALBUMIN SERPL BCP-MCNC: 2.4 G/DL
ALLENS TEST: ABNORMAL
ALP SERPL-CCNC: 47 U/L
ALT SERPL W/O P-5'-P-CCNC: 116 U/L
ANION GAP SERPL CALC-SCNC: 6 MMOL/L
ANISOCYTOSIS BLD QL SMEAR: SLIGHT
AST SERPL-CCNC: 61 U/L
BACTERIA SPEC AEROBE CULT: NORMAL
BASOPHILS # BLD AUTO: 0 K/UL
BASOPHILS # BLD AUTO: 0 K/UL
BASOPHILS # BLD AUTO: ABNORMAL K/UL
BASOPHILS # BLD AUTO: ABNORMAL K/UL
BASOPHILS NFR BLD: 0 %
BILIRUB SERPL-MCNC: 1.6 MG/DL
BUN SERPL-MCNC: 13 MG/DL
BURR CELLS BLD QL SMEAR: ABNORMAL
CALCIUM SERPL-MCNC: 8.4 MG/DL
CHLORIDE SERPL-SCNC: 112 MMOL/L
CO2 SERPL-SCNC: 21 MMOL/L
CORTIS SERPL-MCNC: 7.4 UG/DL
CREAT SERPL-MCNC: 1 MG/DL
DELSYS: ABNORMAL
DIFFERENTIAL METHOD: ABNORMAL
EOSINOPHIL # BLD AUTO: 0.1 K/UL
EOSINOPHIL # BLD AUTO: 0.1 K/UL
EOSINOPHIL # BLD AUTO: ABNORMAL K/UL
EOSINOPHIL # BLD AUTO: ABNORMAL K/UL
EOSINOPHIL NFR BLD: 0 %
EOSINOPHIL NFR BLD: 0.8 %
EOSINOPHIL NFR BLD: 1.3 %
EOSINOPHIL NFR BLD: 2 %
ERYTHROCYTE [DISTWIDTH] IN BLOOD BY AUTOMATED COUNT: 16.3 %
ERYTHROCYTE [DISTWIDTH] IN BLOOD BY AUTOMATED COUNT: 16.4 %
ERYTHROCYTE [DISTWIDTH] IN BLOOD BY AUTOMATED COUNT: 16.5 %
ERYTHROCYTE [DISTWIDTH] IN BLOOD BY AUTOMATED COUNT: 16.8 %
EST. GFR  (AFRICAN AMERICAN): >60 ML/MIN/1.73 M^2
EST. GFR  (NON AFRICAN AMERICAN): 54 ML/MIN/1.73 M^2
FIO2: 30
GIANT PLATELETS BLD QL SMEAR: PRESENT
GLUCOSE SERPL-MCNC: 67 MG/DL
GRAM STN SPEC: NORMAL
HCO3 UR-SCNC: 20.1 MMOL/L (ref 24–28)
HCT VFR BLD AUTO: 26 %
HCT VFR BLD AUTO: 27.4 %
HCT VFR BLD AUTO: 27.7 %
HCT VFR BLD AUTO: 28 %
HGB BLD-MCNC: 9 G/DL
HGB BLD-MCNC: 9.6 G/DL
HGB BLD-MCNC: 9.7 G/DL
HGB BLD-MCNC: 9.8 G/DL
HYPOCHROMIA BLD QL SMEAR: ABNORMAL
LYMPHOCYTES # BLD AUTO: 1.2 K/UL
LYMPHOCYTES # BLD AUTO: 1.6 K/UL
LYMPHOCYTES # BLD AUTO: ABNORMAL K/UL
LYMPHOCYTES # BLD AUTO: ABNORMAL K/UL
LYMPHOCYTES NFR BLD: 15.6 %
LYMPHOCYTES NFR BLD: 16 %
LYMPHOCYTES NFR BLD: 18.1 %
LYMPHOCYTES NFR BLD: 20 %
MCH RBC QN AUTO: 30.1 PG
MCH RBC QN AUTO: 30.1 PG
MCH RBC QN AUTO: 30.3 PG
MCH RBC QN AUTO: 30.4 PG
MCHC RBC AUTO-ENTMCNC: 34.6 G/DL
MCHC RBC AUTO-ENTMCNC: 35 G/DL
MCV RBC AUTO: 86 FL
MCV RBC AUTO: 86 FL
MCV RBC AUTO: 87 FL
MCV RBC AUTO: 87 FL
MODE: ABNORMAL
MONOCYTES # BLD AUTO: 0.8 K/UL
MONOCYTES # BLD AUTO: 0.9 K/UL
MONOCYTES # BLD AUTO: ABNORMAL K/UL
MONOCYTES # BLD AUTO: ABNORMAL K/UL
MONOCYTES NFR BLD: 10 %
MONOCYTES NFR BLD: 11.2 %
MONOCYTES NFR BLD: 7 %
MONOCYTES NFR BLD: 9.8 %
NEUTROPHILS # BLD AUTO: 5.4 K/UL
NEUTROPHILS # BLD AUTO: 6.3 K/UL
NEUTROPHILS NFR BLD: 66 %
NEUTROPHILS NFR BLD: 71 %
NEUTROPHILS NFR BLD: 71 %
NEUTROPHILS NFR BLD: 71.9 %
NEUTS BAND NFR BLD MANUAL: 4 %
NEUTS BAND NFR BLD MANUAL: 4 %
NRBC BLD-RTO: ABNORMAL /100 WBC
NRBC BLD-RTO: ABNORMAL /100 WBC
OB PNL STL: NEGATIVE
OVALOCYTES BLD QL SMEAR: ABNORMAL
OVALOCYTES BLD QL SMEAR: ABNORMAL
PCO2 BLDA: 30 MMHG (ref 35–45)
PEEP: 5
PH SMN: 7.43 [PH] (ref 7.35–7.45)
PLATELET # BLD AUTO: 107 K/UL
PLATELET # BLD AUTO: 109 K/UL
PLATELET # BLD AUTO: 111 K/UL
PLATELET # BLD AUTO: 95 K/UL
PLATELET BLD QL SMEAR: ABNORMAL
PLATELET BLD QL SMEAR: ABNORMAL
PMV BLD AUTO: 10.7 FL
PMV BLD AUTO: 10.7 FL
PMV BLD AUTO: 11.2 FL
PMV BLD AUTO: 11.2 FL
PO2 BLDA: 122 MMHG (ref 80–100)
POC BE: -4 MMOL/L
POC SATURATED O2: 99 % (ref 95–100)
POC TCO2: 21 MMOL/L (ref 23–27)
POCT GLUCOSE: 103 MG/DL (ref 70–110)
POCT GLUCOSE: 132 MG/DL (ref 70–110)
POCT GLUCOSE: 54 MG/DL (ref 70–110)
POCT GLUCOSE: 60 MG/DL (ref 70–110)
POCT GLUCOSE: 81 MG/DL (ref 70–110)
POCT GLUCOSE: 90 MG/DL (ref 70–110)
POIKILOCYTOSIS BLD QL SMEAR: SLIGHT
POIKILOCYTOSIS BLD QL SMEAR: SLIGHT
POLYCHROMASIA BLD QL SMEAR: ABNORMAL
POLYCHROMASIA BLD QL SMEAR: ABNORMAL
POTASSIUM SERPL-SCNC: 3.8 MMOL/L
PROT SERPL-MCNC: 4.9 G/DL
PS: 5
RBC # BLD AUTO: 2.99 M/UL
RBC # BLD AUTO: 3.17 M/UL
RBC # BLD AUTO: 3.22 M/UL
RBC # BLD AUTO: 3.22 M/UL
SAMPLE: ABNORMAL
SCHISTOCYTES BLD QL SMEAR: ABNORMAL
SITE: ABNORMAL
SODIUM SERPL-SCNC: 139 MMOL/L
TARGETS BLD QL SMEAR: ABNORMAL
TARGETS BLD QL SMEAR: ABNORMAL
VANCOMYCIN SERPL-MCNC: 6.6 UG/ML
WBC # BLD AUTO: 7.5 K/UL
WBC # BLD AUTO: 8.86 K/UL
WBC # BLD AUTO: 9.45 K/UL
WBC # BLD AUTO: 9.48 K/UL

## 2018-03-02 PROCEDURE — C9113 INJ PANTOPRAZOLE SODIUM, VIA: HCPCS | Performed by: STUDENT IN AN ORGANIZED HEALTH CARE EDUCATION/TRAINING PROGRAM

## 2018-03-02 PROCEDURE — 63600175 PHARM REV CODE 636 W HCPCS: Performed by: STUDENT IN AN ORGANIZED HEALTH CARE EDUCATION/TRAINING PROGRAM

## 2018-03-02 PROCEDURE — 94761 N-INVAS EAR/PLS OXIMETRY MLT: CPT

## 2018-03-02 PROCEDURE — 37799 UNLISTED PX VASCULAR SURGERY: CPT

## 2018-03-02 PROCEDURE — S5010 5% DEXTROSE AND 0.45% SALINE: HCPCS | Performed by: STUDENT IN AN ORGANIZED HEALTH CARE EDUCATION/TRAINING PROGRAM

## 2018-03-02 PROCEDURE — 85027 COMPLETE CBC AUTOMATED: CPT

## 2018-03-02 PROCEDURE — 25000003 PHARM REV CODE 250: Performed by: STUDENT IN AN ORGANIZED HEALTH CARE EDUCATION/TRAINING PROGRAM

## 2018-03-02 PROCEDURE — 80202 ASSAY OF VANCOMYCIN: CPT

## 2018-03-02 PROCEDURE — 82533 TOTAL CORTISOL: CPT

## 2018-03-02 PROCEDURE — 82272 OCCULT BLD FECES 1-3 TESTS: CPT

## 2018-03-02 PROCEDURE — 85025 COMPLETE CBC W/AUTO DIFF WBC: CPT | Mod: 91

## 2018-03-02 PROCEDURE — 85007 BL SMEAR W/DIFF WBC COUNT: CPT

## 2018-03-02 PROCEDURE — 20000000 HC ICU ROOM

## 2018-03-02 PROCEDURE — 82803 BLOOD GASES ANY COMBINATION: CPT

## 2018-03-02 PROCEDURE — 80053 COMPREHEN METABOLIC PANEL: CPT

## 2018-03-02 PROCEDURE — 25000003 PHARM REV CODE 250: Performed by: INTERNAL MEDICINE

## 2018-03-02 PROCEDURE — 27000221 HC OXYGEN, UP TO 24 HOURS

## 2018-03-02 PROCEDURE — 94003 VENT MGMT INPAT SUBQ DAY: CPT

## 2018-03-02 RX ORDER — CEFTRIAXONE 1 G/1
1 INJECTION, POWDER, FOR SOLUTION INTRAMUSCULAR; INTRAVENOUS
Status: DISCONTINUED | OUTPATIENT
Start: 2018-03-02 | End: 2018-03-02

## 2018-03-02 RX ORDER — HYDRALAZINE HYDROCHLORIDE 20 MG/ML
10 INJECTION INTRAMUSCULAR; INTRAVENOUS
Status: DISCONTINUED | OUTPATIENT
Start: 2018-03-02 | End: 2018-03-02

## 2018-03-02 RX ORDER — DEXTROSE MONOHYDRATE AND SODIUM CHLORIDE 5; .45 G/100ML; G/100ML
INJECTION, SOLUTION INTRAVENOUS CONTINUOUS
Status: DISCONTINUED | OUTPATIENT
Start: 2018-03-02 | End: 2018-03-03

## 2018-03-02 RX ORDER — HYDRALAZINE HYDROCHLORIDE 20 MG/ML
10 INJECTION INTRAMUSCULAR; INTRAVENOUS EVERY 8 HOURS PRN
Status: DISCONTINUED | OUTPATIENT
Start: 2018-03-02 | End: 2018-03-06 | Stop reason: HOSPADM

## 2018-03-02 RX ADMIN — PIPERACILLIN AND TAZOBACTAM 4.5 G: 4; .5 INJECTION, POWDER, LYOPHILIZED, FOR SOLUTION INTRAVENOUS; PARENTERAL at 11:03

## 2018-03-02 RX ADMIN — PANTOPRAZOLE SODIUM 40 MG: 40 INJECTION, POWDER, FOR SOLUTION INTRAVENOUS at 08:03

## 2018-03-02 RX ADMIN — PIPERACILLIN AND TAZOBACTAM 4.5 G: 4; .5 INJECTION, POWDER, LYOPHILIZED, FOR SOLUTION INTRAVENOUS; PARENTERAL at 06:03

## 2018-03-02 RX ADMIN — HYDRALAZINE HYDROCHLORIDE 10 MG: 20 INJECTION INTRAMUSCULAR; INTRAVENOUS at 10:03

## 2018-03-02 RX ADMIN — VANCOMYCIN HYDROCHLORIDE 1000 MG: 1 INJECTION, POWDER, LYOPHILIZED, FOR SOLUTION INTRAVENOUS at 05:03

## 2018-03-02 RX ADMIN — PIPERACILLIN AND TAZOBACTAM 4.5 G: 4; .5 INJECTION, POWDER, LYOPHILIZED, FOR SOLUTION INTRAVENOUS; PARENTERAL at 04:03

## 2018-03-02 RX ADMIN — LEVETIRACETAM 1500 MG: 15 INJECTION INTRAVENOUS at 10:03

## 2018-03-02 RX ADMIN — DEXTROSE AND SODIUM CHLORIDE: 5; .45 INJECTION, SOLUTION INTRAVENOUS at 07:03

## 2018-03-02 RX ADMIN — LEVETIRACETAM 1500 MG: 15 INJECTION INTRAVENOUS at 08:03

## 2018-03-02 RX ADMIN — HYDRALAZINE HYDROCHLORIDE 10 MG: 20 INJECTION INTRAMUSCULAR; INTRAVENOUS at 05:03

## 2018-03-02 RX ADMIN — OSELTAMIVIR PHOSPHATE 30 MG: 6 POWDER, FOR SUSPENSION ORAL at 08:03

## 2018-03-02 RX ADMIN — DEXTROSE MONOHYDRATE 12.5 G: 500 INJECTION PARENTERAL at 12:03

## 2018-03-02 RX ADMIN — DEXTROSE MONOHYDRATE 12.5 G: 500 INJECTION PARENTERAL at 04:03

## 2018-03-02 RX ADMIN — PANTOPRAZOLE SODIUM 40 MG: 40 INJECTION, POWDER, FOR SOLUTION INTRAVENOUS at 10:03

## 2018-03-02 NOTE — PROGRESS NOTES
"Subjective: Patient resting bed this afternoon, extubated, REMBERTO, tachy overnight      Objective:  BP (!) 178/106   Pulse 79   Temp 98.6 °F (37 °C) (Axillary)   Resp 18   Ht 5' 2" (1.575 m)   Wt 67.9 kg (149 lb 11.1 oz)   SpO2 100%   BMI 27.38 kg/m²       Current Facility-Administered Medications:     0.9%  NaCl infusion (for blood administration), , Intravenous, Q24H PRN, Reyna Gomez MD    0.9%  NaCl infusion (for blood administration), , Intravenous, Q24H PRN, Reyna Gomez MD    dextrose 5 % and 0.45 % NaCl infusion, , Intravenous, Continuous, Naz Frederick MD, Last Rate: 70 mL/hr at 03/02/18 0757    dextrose 50% injection 12.5 g, 12.5 g, Intravenous, PRN, Mis Slade MD, 12.5 g at 03/02/18 0437    dextrose 50% injection 25 g, 25 g, Intravenous, Once, Reyna Hager MD    dextrose 50% injection 25 g, 25 g, Intravenous, PRN, Mis Slade MD    glucagon (human recombinant) injection 1 mg, 1 mg, Intramuscular, PRN, Mis Slade MD    glucose chewable tablet 16 g, 16 g, Oral, PRN, Mis Slade MD    glucose chewable tablet 24 g, 24 g, Oral, PRN, Mis Slade MD    levETIRAcetam in NaCl (iso-os) IVPB 1,500 mg, 1,500 mg, Intravenous, Q12H, Lj Hawkins MD, 1,500 mg at 03/02/18 0849    lorazepam (ATIVAN) injection 1 mg, 1 mg, Intravenous, Q1H PRN, Hugh Motta DO    oseltamivir 6 mg/mL 30 mg, 30 mg, Oral, BID, Skylar Delatorre MD, 30 mg at 03/02/18 0849    pantoprazole injection 40 mg, 40 mg, Intravenous, BID, Lj Hawkins MD, 40 mg at 03/02/18 0849    piperacillin-tazobactam 4.5 g in dextrose 5 % 100 mL IVPB (ready to mix system), 4.5 g, Intravenous, Q8H, Lj Hawkins MD    vancomycin 1 g in 0.9% sodium chloride 250 mL IVPB (ready to mix system), 15 mg/kg, Intravenous, Q24H, Lj Hawkins MD    Neurological exam  Older woman resting in bed in no distress  Not " speaking, not following commands; unable to assess orientation  Left gaze preference, but occasionally looks to right  EOMI, pupils are pinpoint, facial expression symmetric  Moving upper extremities against gravity  Non-purposeful movements of lower extremities  Action myoclonus in the RUE  Sensation to pain intact     A&P: 79 y/o woman w/ advance dementia, admitted to ICU with sepsis/PNA, extubated today. No additional seizure activity noted, but intermittent myoclonic jerks and non-purposeful movements. Near baseline per family.    Seizure like activity  - Consider MRI brain to r/o prior stroke/anatomic lesion though unlikely to   - if evidence of CVA on MRI, consider starting ASA 81 mg +statin for secondary prevention  - likely secondary to acute illness lowering seizure threshold, already at risk given dementia  - Check ammonia level  - Ensure electrolytes are WNL  - Continue Keppra 1500 mg q 12h while inpatient  - Can consider reducing to 1000 mg q 12h once outside of acute illness  - follow up with PCP at discharge    Neurology will sign off at this time, please contact resident on call with any additional questions    Sagar Shelton MD  [facility]

## 2018-03-02 NOTE — PLAN OF CARE
Problem: Patient Care Overview  Goal: Plan of Care Review  Outcome: Ongoing (interventions implemented as appropriate)  Pt lying comfortably in bed, calm, withdrawn, not responding to commands, withdraws to pain. Respirations even and unlabored. SATS 100% on AC 16/350/5/30%. Systolic 's-170's and diastolic BP 70's-80's. Tele sinus rhythm with PAC's--HR 80's-100's. Normothermic. Urine output 30-200ml/hr. No seizure activity noted during night. Hypoglycemic during night--glucose 57-62. Small soft formed light brown stool during shift. Bruising noted to right flank. Will continue to monitor.

## 2018-03-02 NOTE — NURSING
Dr. Murillo notified pt has been having glucose at 57, 62, and 60 the last three times she was checked. Half and amp of D50 were given each time per protocol ordered and pt responded well but has been unable to maintain. No new orders for maintenance fluids or tube feedings at this time. Will consult with primary team when they arrive this morning. Will continue to monitor.

## 2018-03-02 NOTE — PROGRESS NOTES
[facility] Resident HO-II Progress Note - Pulmonology Service    Subjective:      Verna Carranza is a 78 y.o. female who is being followed by the [facility] service at [facility] for acute hypoxemic and hypercapnic respiratory failure 2/2 aspiration.     No further seizure activity overnight.  Low glucoses reported, D50 given with temporary improvement.  Patient remains intubated, comfortable on the vent without sedation.  Good UOP in Javier.  No further bleeding.     Objective:   Last 24 Hour Vital Signs:  BP  Min: 111/78  Max: 167/84  Temp  Av.6 °F (36.4 °C)  Min: 97.1 °F (36.2 °C)  Max: 99.6 °F (37.6 °C)  Pulse  Av.6  Min: 71  Max: 140  Resp  Av.6  Min: 0  Max: 28  SpO2  Av %  Min: 100 %  Max: 100 %  Weight  Av.9 kg (149 lb 11.1 oz)  Min: 67.9 kg (149 lb 11.1 oz)  Max: 67.9 kg (149 lb 11.1 oz)  I/O last 3 completed shifts:  In: 2248 [Blood:1198; NG/GT:100; IV Piggyback:950]  Out: 1835 [Urine:1635; Drains:200]    Physical Examination:  General: intubated, awake,   HEENT: PERRL, anicteric sclera, normal conjunctiva, MMM, ETT in place  Neck: supple, no thyromegaly, LIJ removed and dressing c/d/i  CV: RRR, no murmur  Resp: CTAB, no wheezes or rales on VC/350/14/30%/5  Abd: soft, NT, ND, normoactive BS, OGT with green/brown drainage  : Javier in place with yellow urine  Extr: WWP, normal ROM, keeps legs drawn up  Skin: ecchymosis R flank extending into inguinal crease  Neuro: GCS 10T, RASS =0, opens eyes spontaneously, does not follow commands, nonverbal, moves extremities spontaneously      Laboratory:  Laboratory Data:     Recent Labs  Lab 18  0500 18  0435  18  1842 18  2341 18  0415   WBC 15.54*  --   < > 13.65* 9.48 9.45   HGB 8.9*  --   < > 9.4* 9.8* 9.7*   HCT 25.8*  --   < > 27.0* 28.0* 27.7*   PLT 40*  --   < > 99* 109* 107*   MCV 93  --   < > 87 87 86   RDW 17.0*  --   < > 15.8* 16.3* 16.4*   * 135*  --   --   --  139   K  4.6 4.5  --   --   --  3.8    112*  --   --   --  112*   CO2 18* 18*  --   --   --  21*   BUN 17 18  --   --   --  13   CREATININE 1.2 1.3  --   --   --  1.0   * 90  --   --   --  67*   PROT 4.6* 4.2*  --   --   --  4.9*   ALBUMIN 2.0* 2.0*  --   --   --  2.4*   BILITOT 0.5 0.8  --   --   --  1.6*   AST 69* 67*  --   --   --  61*   ALKPHOS 56 42*  --   --   --  47*   * 131*  --   --   --  116*   < > = values in this interval not displayed.    Haptoglobin 69    Fibrinogen 282  D dimer 1.86  INR 1.1    Microbiology Data:   BCx (2/26) NGTD  UCx (2/26) NG  Resp cx (2/27) gram stain rare WBC, no organisms    Other Results:  Radiology Data:   CT Chest (2/26) - large RLL consolidation  CT Abd/Pelvis (2/27) - distended gallbladder, possible colon wall thickening    Current Medications:     Infusions:   dextrose 5 % and 0.45 % NaCl          Scheduled:   dextrose 50%  25 g Intravenous Once    levetiracetam IVPB  1,500 mg Intravenous Q12H    oseltamivir  30 mg Oral BID    pantoprazole  40 mg Intravenous BID    piperacillin-tazobactam (ZOSYN) IVPB  4.5 g Intravenous Q8H    vancomycin (VANCOCIN) IVPB  15 mg/kg Intravenous Q48H        PRN:  sodium chloride, sodium chloride, dextrose 50%, dextrose 50%, glucagon (human recombinant), glucose, glucose, lorazepam    Antibiotics and Day Number of Therapy:  Vanc/Zosyn (Day 4)    Lines and Day Number of Therapy:  L radial art line  L PIV x2  OGT  Javier  ETT    Assessment:     Verna Carranza is a 78 y.o.female with  Patient Active Problem List    Diagnosis Date Noted    Acidosis 03/01/2018    Shock 03/01/2018    Seizure 02/28/2018    Septic shock 02/27/2018    Acute respiratory failure with hypoxia and hypercapnia 02/27/2018    Acute encephalopathy 02/27/2018    Pneumonia of right lower lobe due to infectious organism 02/27/2018    AMY (acute kidney injury) 02/27/2018    CKD (chronic kidney disease), stage III 02/27/2018    Transaminitis  02/27/2018    Elevated troponin 02/27/2018    Advanced dementia 02/27/2018    Bradycardia     Altered mental state 02/26/2018     Plan:     Acute Respiratory Failure with Hypoxia and Hypercapnia 2/2 Aspiration Pneumonia  - consider abx de-escalation - stop vancomycin  - SBT today  patient will have different extubation parameters, as she is unable to follow commands at baseline  - will need swallow evaluation once extubated    Thrombocytopenia, Anemia   - improved s/p 2 units RBC and platelets  - FOBT negative, abd ultrasound without retroperitoneal bleed  - labs inconsistent with DIC or TTP  peripheral smear still pending  - continue to monitor H&H; likely etiology from R flank hematoma?    Seizure  - continue keppra 1.5g BID    Hypoglycemia  - agree with addition of glucose to mIVF temporarily    Reyna Gomez  [facility] HO-II  [facility] Pulmonology Service

## 2018-03-02 NOTE — PLAN OF CARE
Problem: Patient Care Overview  Goal: Plan of Care Review  Outcome: Ongoing (interventions implemented as appropriate)  Pt. Extubated this am.   Plan for MRI head  PT/OT/ST eval and treat in am  POC reviewed with pt. And family.

## 2018-03-02 NOTE — PROGRESS NOTES
This note also relates to the following rows which could not be included:  Oxygen Concentration (%) - Cannot attach notes to unvalidated device data  SpO2 - Cannot attach notes to unvalidated device data  Pulse - Cannot attach notes to unvalidated device data  Resp - Cannot attach notes to unvalidated device data  Vent Mode - Cannot attach notes to unvalidated device data  Set Rate - Cannot attach notes to unvalidated device data  Vt Set - Cannot attach notes to unvalidated device data  PEEP/CPAP - Cannot attach notes to unvalidated device data  Pressure Support - Cannot attach notes to unvalidated device data  Waveform - Cannot attach notes to unvalidated device data  Peak Flow - Cannot attach notes to unvalidated device data  Set Inspiratory Pressure - Cannot attach notes to unvalidated device data  Insp Time - Cannot attach notes to unvalidated device data  Plateau Set/Insp. Hold (sec) - Cannot attach notes to unvalidated device data  Trigger Sensitivity Flow/I-Trigger - Cannot attach notes to unvalidated device data  Resp Rate Total - Cannot attach notes to unvalidated device data  Peak Airway Pressure - Cannot attach notes to unvalidated device data  Mean Airway Pressure - Cannot attach notes to unvalidated device data  Plateau Pressure - Cannot attach notes to unvalidated device data  Exhaled Vt - Cannot attach notes to unvalidated device data  Total Ve - Cannot attach notes to unvalidated device data  Spont Ve - Cannot attach notes to unvalidated device data  I:E Ratio Measured - Cannot attach notes to unvalidated device data  Vt Low Alarm - Cannot attach notes to unvalidated device data  Resp Rate High Alarm - Cannot attach notes to unvalidated device data  Apnea Rate - Cannot attach notes to unvalidated device data  Apnea Volume (mL) - Cannot attach notes to unvalidated device data  Apnea Oxygen Concentration  - Cannot attach notes to unvalidated device data  Apnea Flow Rate (L/min) - Cannot attach notes to  unvalidated device data    Pt received as charted on vent flowsheet. Ambu bag and mask at bedside. All alarms on and functional with adequate volume. Cuff pressure monitored via MLT. Pt with no apprent distress noted. Will continue to  Monitor.

## 2018-03-02 NOTE — PLAN OF CARE
Acute Respiratory Failure with Hypoxia and Hypercapnia 2/2 Aspiration Pneumonia  Pt remains on vent; progress notes reviewed:  - SBT today;  she is unable to follow commands at baseline    Pt has multiple family members at bedside including her son Laurent and granddaughter Margarette; family updated on plan of care .    [place] to continue to follow.    Discharge date/dispostion TBD.       03/02/18 0955   Discharge Reassessment   Assessment Type Discharge Planning Reassessment   Provided patient/caregiver education on the expected discharge date and the discharge plan Yes   Do you have any problems affording any of your prescribed medications? No   Discharge Plan A Skilled Nursing Facility   Discharge Plan B Home with family;Home Health   Patient choice form signed by patient/caregiver N/A   Can the patient answer the patient profile reliably? No, cognitively impaired   How does the patient rate their overall health at the present time? Fair   Describe the patient's ability to walk at the present time. Does not walk or unable to take any steps at all   How often would a person be available to care for the patient? Often   Number of comorbid conditions (as recorded on the chart) Four   During the past month, has the patient often been bothered by feeling down, depressed or hopeless? No   During the past month, has the patient often been bothered by little interest or pleasure in doing things? No

## 2018-03-02 NOTE — PLAN OF CARE
Problem: Patient Care Overview  Goal: Plan of Care Review  Outcome: Ongoing (interventions implemented as appropriate)  Patient on  with documented settings.  Alarms are set and functioning with adequate volumes.  AMBU bag and mask at bedside.  Will continue to monitor.

## 2018-03-02 NOTE — PROGRESS NOTES
U Internal Medicine Resident [facility] Progress Note    Subjective:      Awake and alert with no reported seizure activity overnight. Remains hemodynamically stable.     Objective:   Last 24 Hour Vital Signs:  BP  Min: 111/78  Max: 167/84  Temp  Av.5 °F (36.4 °C)  Min: 97.1 °F (36.2 °C)  Max: 99.6 °F (37.6 °C)  Pulse  Av.5  Min: 71  Max: 140  Resp  Av.6  Min: 0  Max: 28  SpO2  Av %  Min: 100 %  Max: 100 %  Weight  Av.9 kg (149 lb 11.1 oz)  Min: 67.9 kg (149 lb 11.1 oz)  Max: 67.9 kg (149 lb 11.1 oz)  I/O last 3 completed shifts:  In: 1898 [Blood:1198; NG/GT:50; IV Piggyback:650]  Out: 1510 [Urine:960; Drains:550]    Physical Examination:  General: Alert and awake.  Not following commands  HENT: NCAT; anicteric sclera; ETT and OGT in place.    Cardio: Regular rate and rhythm with normal S1 and S2; no murmurs  Resp: Mechanical breath sounds bilaterally.   Abdom: Soft, ND, palpable hematoma in RLQ extending to back  Extrem: WWP with no edema  Pulses: 2+ and symmetric distally  Neuro: Awake.  Not following commands. Non-purposeful movements of BUE.     Laboratory:  Laboratory Data Reviewed: yes  Pertinent Findings:  Lab Results   Component Value Date    WBC 9.45 2018    HGB 9.7 (L) 2018    HCT 27.7 (L) 2018    MCV 86 2018     (L) 2018     Lab Results   Component Value Date    CREATININE 1.0 2018    BUN 13 2018     2018    K 3.8 2018     (H) 2018    CO2 21 (L) 2018     Lab Results   Component Value Date     (H) 2018    AST 61 (H) 2018    ALKPHOS 47 (L) 2018    BILITOT 1.6 (H) 2018     FOBT negative    Haptoglobin 69    Microbiology Data Reviewed: yes  Pertinent Findings:   Resp Cx: NG   BCx: NGTD    Other Results:  Radiology Data Reviewed: yes  Pertinent Findings:  3/1 U/S abdomen: small nonspecific intraperitoneal fluid in R upper abdomen    Current Medications:      Infusions:       Scheduled:   dextrose 50%  25 g Intravenous Once    levetiracetam IVPB  1,500 mg Intravenous Q12H    oseltamivir  30 mg Oral BID    pantoprazole  40 mg Intravenous BID    piperacillin-tazobactam (ZOSYN) IVPB  4.5 g Intravenous Q8H    vancomycin (VANCOCIN) IVPB  15 mg/kg Intravenous Q48H        PRN:  sodium chloride, sodium chloride, dextrose 50%, dextrose 50%, glucagon (human recombinant), glucose, glucose, lorazepam    Antibiotics and Day Number of Therapy:  Vancomycin 2/26 - present  Zosyn 2/26 - present    Assessment:     Verna Carranza is a 78 y.o.female with  Patient Active Problem List    Diagnosis Date Noted    Acidosis 03/01/2018    Shock 03/01/2018    Seizure 02/28/2018    Septic shock 02/27/2018    Acute respiratory failure with hypoxia and hypercapnia 02/27/2018    Acute encephalopathy 02/27/2018    Pneumonia of right lower lobe due to infectious organism 02/27/2018    AMY (acute kidney injury) 02/27/2018    CKD (chronic kidney disease), stage III 02/27/2018    Transaminitis 02/27/2018    Elevated troponin 02/27/2018    Advanced dementia 02/27/2018    Bradycardia     Altered mental state 02/26/2018        Plan:     Neuro:  - Alert and awake off sedation.  Non-purposeful movements, not following commands  - Neuro consulted.  CT head with stable cerebral atrophy  - EEG without focal seizure, diffuse slowing.  Increasing keppra to 1500 BID  - Above findings are concerning for poor prognosis, kameron in setting of baseline dementia and prolonged ICU stay     CVS:  - Hemodynamically stable off pressors. ECHO with DD and PHTN     Resp:  - On vent without sedation.  No dyssynchrony.  Continue to wean as tolerated     FEN/GI:  - Persistent hypoglycemia not maintained with amp D50 pushes.  Will check a.m. cortisol and re-start MIVF.  Continue aggressive electrolyte repletion as needed     Heme/ID:  - Remains afebrile.  UCx, BCx, RCx all NGTD  - Continue empiric Tamiflu, Vanc and  Zosyn  - S/p 2U pRBCs, 1U platelets >> Hgb 9.7, platelts 107 this a.m.  - LDH, haptoglobin wnl.  Peripheral smear not yet reviewed.     Renal:  - Cr remains stable.  Avoiding nephrotoxins     Endo:  - Persistent hypoglycemia overnight.  Will check a.m. cortisol, re-start MIVF     CODE:  DNR  VTE PPx:  None 2/2 bleeding and thrombocytopenia   Disposition:  Pending ongoing ICU care and GOC discussion with family    Naz Frederick  [facility] Internal Medicine HO-I  U IM Service Team A    [facility] Medicine Hospitalist Pager numbers:   [facility] Hospitalist Medicine Team A (Pepe/Nandini): [phone]  [facility] Hospitalist Medicine Team B (Maribell/Rhonda):  [phone]

## 2018-03-02 NOTE — PT/OT/SLP PROGRESS
Speech Language Pathology  Missed Visit    Verna Carranza  MRN: [id number]    Patient not seen today secondary to 24 hour protocol post extubation for swallow eval. SLP will follow-up next date for clincial swallow eval when pt is medically appropriate. SLP notified SERINA Miranda.    PAVAN Voss., CF-SLP  Speech-Language Pathologist   3/2/2018

## 2018-03-03 LAB
ALBUMIN SERPL BCP-MCNC: 2.3 G/DL
ALP SERPL-CCNC: 53 U/L
ALT SERPL W/O P-5'-P-CCNC: 101 U/L
ANION GAP SERPL CALC-SCNC: 8 MMOL/L
ANISOCYTOSIS BLD QL SMEAR: SLIGHT
AST SERPL-CCNC: 49 U/L
BACTERIA BLD CULT: NORMAL
BACTERIA BLD CULT: NORMAL
BASOPHILS # BLD AUTO: 0 K/UL
BASOPHILS # BLD AUTO: 0 K/UL
BASOPHILS # BLD AUTO: ABNORMAL K/UL
BASOPHILS # BLD AUTO: ABNORMAL K/UL
BASOPHILS NFR BLD: 0 %
BILIRUB SERPL-MCNC: 1.8 MG/DL
BUN SERPL-MCNC: 8 MG/DL
CALCIUM SERPL-MCNC: 8.4 MG/DL
CHLORIDE SERPL-SCNC: 114 MMOL/L
CO2 SERPL-SCNC: 22 MMOL/L
CREAT SERPL-MCNC: 0.9 MG/DL
DIFFERENTIAL METHOD: ABNORMAL
EOSINOPHIL # BLD AUTO: 0.1 K/UL
EOSINOPHIL # BLD AUTO: 0.1 K/UL
EOSINOPHIL # BLD AUTO: ABNORMAL K/UL
EOSINOPHIL # BLD AUTO: ABNORMAL K/UL
EOSINOPHIL NFR BLD: 0 %
EOSINOPHIL NFR BLD: 0.8 %
EOSINOPHIL NFR BLD: 0.8 %
EOSINOPHIL NFR BLD: 1 %
ERYTHROCYTE [DISTWIDTH] IN BLOOD BY AUTOMATED COUNT: 16.2 %
ERYTHROCYTE [DISTWIDTH] IN BLOOD BY AUTOMATED COUNT: 16.3 %
ERYTHROCYTE [DISTWIDTH] IN BLOOD BY AUTOMATED COUNT: 16.3 %
ERYTHROCYTE [DISTWIDTH] IN BLOOD BY AUTOMATED COUNT: 16.5 %
EST. GFR  (AFRICAN AMERICAN): >60 ML/MIN/1.73 M^2
EST. GFR  (NON AFRICAN AMERICAN): >60 ML/MIN/1.73 M^2
GLUCOSE SERPL-MCNC: 92 MG/DL
HCT VFR BLD AUTO: 27.1 %
HCT VFR BLD AUTO: 28.1 %
HCT VFR BLD AUTO: 28.5 %
HCT VFR BLD AUTO: 28.7 %
HGB BLD-MCNC: 10.1 G/DL
HGB BLD-MCNC: 10.2 G/DL
HGB BLD-MCNC: 9.4 G/DL
HGB BLD-MCNC: 9.8 G/DL
HYPOCHROMIA BLD QL SMEAR: ABNORMAL
LYMPHOCYTES # BLD AUTO: 1.5 K/UL
LYMPHOCYTES # BLD AUTO: 1.6 K/UL
LYMPHOCYTES # BLD AUTO: ABNORMAL K/UL
LYMPHOCYTES # BLD AUTO: ABNORMAL K/UL
LYMPHOCYTES NFR BLD: 12 %
LYMPHOCYTES NFR BLD: 12.4 %
LYMPHOCYTES NFR BLD: 13 %
LYMPHOCYTES NFR BLD: 14.6 %
MCH RBC QN AUTO: 30.5 PG
MCH RBC QN AUTO: 30.5 PG
MCH RBC QN AUTO: 30.6 PG
MCH RBC QN AUTO: 30.7 PG
MCHC RBC AUTO-ENTMCNC: 34.7 G/DL
MCHC RBC AUTO-ENTMCNC: 34.9 G/DL
MCHC RBC AUTO-ENTMCNC: 35.4 G/DL
MCHC RBC AUTO-ENTMCNC: 35.5 G/DL
MCV RBC AUTO: 86 FL
MCV RBC AUTO: 86 FL
MCV RBC AUTO: 88 FL
MCV RBC AUTO: 88 FL
MONOCYTES # BLD AUTO: 1 K/UL
MONOCYTES # BLD AUTO: 1.1 K/UL
MONOCYTES # BLD AUTO: ABNORMAL K/UL
MONOCYTES # BLD AUTO: ABNORMAL K/UL
MONOCYTES NFR BLD: 6 %
MONOCYTES NFR BLD: 7 %
MONOCYTES NFR BLD: 8.7 %
MONOCYTES NFR BLD: 9.7 %
NEUTROPHILS # BLD AUTO: 8.2 K/UL
NEUTROPHILS # BLD AUTO: 9.3 K/UL
NEUTROPHILS NFR BLD: 74.9 %
NEUTROPHILS NFR BLD: 76 %
NEUTROPHILS NFR BLD: 77 %
NEUTROPHILS NFR BLD: 78.1 %
NEUTS BAND NFR BLD MANUAL: 4 %
NEUTS BAND NFR BLD MANUAL: 4 %
NRBC BLD-RTO: ABNORMAL /100 WBC
NRBC BLD-RTO: ABNORMAL /100 WBC
OVALOCYTES BLD QL SMEAR: ABNORMAL
OVALOCYTES BLD QL SMEAR: ABNORMAL
PLATELET # BLD AUTO: 100 K/UL
PLATELET # BLD AUTO: 105 K/UL
PLATELET # BLD AUTO: 108 K/UL
PLATELET # BLD AUTO: 113 K/UL
PLATELET BLD QL SMEAR: ABNORMAL
PMV BLD AUTO: 11.3 FL
PMV BLD AUTO: 11.5 FL
PMV BLD AUTO: 11.6 FL
PMV BLD AUTO: 11.9 FL
POCT GLUCOSE: 110 MG/DL (ref 70–110)
POCT GLUCOSE: 113 MG/DL (ref 70–110)
POCT GLUCOSE: 113 MG/DL (ref 70–110)
POIKILOCYTOSIS BLD QL SMEAR: SLIGHT
POLYCHROMASIA BLD QL SMEAR: ABNORMAL
POTASSIUM SERPL-SCNC: 3.1 MMOL/L
PROT SERPL-MCNC: 5.1 G/DL
RBC # BLD AUTO: 3.08 M/UL
RBC # BLD AUTO: 3.21 M/UL
RBC # BLD AUTO: 3.3 M/UL
RBC # BLD AUTO: 3.32 M/UL
SODIUM SERPL-SCNC: 144 MMOL/L
TARGETS BLD QL SMEAR: ABNORMAL
TARGETS BLD QL SMEAR: ABNORMAL
WBC # BLD AUTO: 11.03 K/UL
WBC # BLD AUTO: 11.5 K/UL
WBC # BLD AUTO: 11.96 K/UL
WBC # BLD AUTO: 12.57 K/UL

## 2018-03-03 PROCEDURE — G8997 SWALLOW GOAL STATUS: HCPCS | Mod: CK

## 2018-03-03 PROCEDURE — G8996 SWALLOW CURRENT STATUS: HCPCS | Mod: CM

## 2018-03-03 PROCEDURE — 25000003 PHARM REV CODE 250: Performed by: STUDENT IN AN ORGANIZED HEALTH CARE EDUCATION/TRAINING PROGRAM

## 2018-03-03 PROCEDURE — C9113 INJ PANTOPRAZOLE SODIUM, VIA: HCPCS | Performed by: STUDENT IN AN ORGANIZED HEALTH CARE EDUCATION/TRAINING PROGRAM

## 2018-03-03 PROCEDURE — 80053 COMPREHEN METABOLIC PANEL: CPT

## 2018-03-03 PROCEDURE — 20000000 HC ICU ROOM

## 2018-03-03 PROCEDURE — 85027 COMPLETE CBC AUTOMATED: CPT | Mod: 91

## 2018-03-03 PROCEDURE — 63600175 PHARM REV CODE 636 W HCPCS: Performed by: STUDENT IN AN ORGANIZED HEALTH CARE EDUCATION/TRAINING PROGRAM

## 2018-03-03 PROCEDURE — 85007 BL SMEAR W/DIFF WBC COUNT: CPT

## 2018-03-03 PROCEDURE — 92610 EVALUATE SWALLOWING FUNCTION: CPT

## 2018-03-03 PROCEDURE — 36415 COLL VENOUS BLD VENIPUNCTURE: CPT

## 2018-03-03 PROCEDURE — 94761 N-INVAS EAR/PLS OXIMETRY MLT: CPT

## 2018-03-03 RX ORDER — POTASSIUM CHLORIDE 20 MEQ/15ML
40 SOLUTION ORAL
Status: DISPENSED | OUTPATIENT
Start: 2018-03-03 | End: 2018-03-03

## 2018-03-03 RX ORDER — DEXTROSE, SODIUM CHLORIDE, SODIUM LACTATE, POTASSIUM CHLORIDE, AND CALCIUM CHLORIDE 5; .6; .31; .03; .02 G/100ML; G/100ML; G/100ML; G/100ML; G/100ML
INJECTION, SOLUTION INTRAVENOUS CONTINUOUS
Status: ACTIVE | OUTPATIENT
Start: 2018-03-03 | End: 2018-03-04

## 2018-03-03 RX ORDER — MAGNESIUM SULFATE HEPTAHYDRATE 40 MG/ML
2 INJECTION, SOLUTION INTRAVENOUS ONCE
Status: COMPLETED | OUTPATIENT
Start: 2018-03-03 | End: 2018-03-03

## 2018-03-03 RX ADMIN — MAGNESIUM SULFATE IN WATER 2 G: 40 INJECTION, SOLUTION INTRAVENOUS at 07:03

## 2018-03-03 RX ADMIN — LEVETIRACETAM 1500 MG: 15 INJECTION INTRAVENOUS at 09:03

## 2018-03-03 RX ADMIN — POTASSIUM CHLORIDE 40 MEQ: 20 SOLUTION ORAL at 09:03

## 2018-03-03 RX ADMIN — PIPERACILLIN AND TAZOBACTAM 4.5 G: 4; .5 INJECTION, POWDER, LYOPHILIZED, FOR SOLUTION INTRAVENOUS; PARENTERAL at 11:03

## 2018-03-03 RX ADMIN — VANCOMYCIN HYDROCHLORIDE 1000 MG: 1 INJECTION, POWDER, LYOPHILIZED, FOR SOLUTION INTRAVENOUS at 05:03

## 2018-03-03 RX ADMIN — PIPERACILLIN AND TAZOBACTAM 4.5 G: 4; .5 INJECTION, POWDER, LYOPHILIZED, FOR SOLUTION INTRAVENOUS; PARENTERAL at 06:03

## 2018-03-03 RX ADMIN — PANTOPRAZOLE SODIUM 40 MG: 40 INJECTION, POWDER, FOR SOLUTION INTRAVENOUS at 09:03

## 2018-03-03 RX ADMIN — DEXTROSE, SODIUM CHLORIDE, SODIUM LACTATE, POTASSIUM CHLORIDE, AND CALCIUM CHLORIDE: 5; .6; .31; .03; .02 INJECTION, SOLUTION INTRAVENOUS at 07:03

## 2018-03-03 RX ADMIN — HYDRALAZINE HYDROCHLORIDE 10 MG: 20 INJECTION INTRAMUSCULAR; INTRAVENOUS at 05:03

## 2018-03-03 RX ADMIN — PIPERACILLIN AND TAZOBACTAM 4.5 G: 4; .5 INJECTION, POWDER, LYOPHILIZED, FOR SOLUTION INTRAVENOUS; PARENTERAL at 02:03

## 2018-03-03 RX ADMIN — HYDRALAZINE HYDROCHLORIDE 10 MG: 20 INJECTION INTRAMUSCULAR; INTRAVENOUS at 07:03

## 2018-03-03 NOTE — PT/OT/SLP EVAL
"Speech Language Pathology Evaluation  Bedside Swallow    Patient Name:  Verna Carranza   MRN:  [id number]  Admitting Diagnosis: Septic shock    Recommendations:                 General Recommendations:  Dysphagia therapy  Diet recommendations:  Pleasure Feeding, Puree (with RN or SLP only), NPO   Aspiration Precautions: Fed only by RN or SLP, eessential PO meds in crushed in puree only, Feed only when awake/alert, Frequent oral care, Puree for pleasure and Standard aspiration precautions   General Precautions: Standard, fall, aspiration  Communication strategies:  none and pt with dcr'd HELLEN 2/2 dementia. Pt states "yeah" to all questions during session    History:     Past Medical History:   Diagnosis Date    Alzheimer disease     Hyperlipidemia     Hypertension        History reviewed. No pertinent surgical history.    History of Present Illness:     78yoF w/HTN, HLD, advanced Alzheimer's dementia (speaks inappropriate words at baseline, unable to perform ADLs, is administered meds at home by her son, eats regular food but has to be fed) was in her usual state of health until about 3-4 days ago when her son, Simba, noticed that she was sleeping more than usual. He denies noticing fevers, chills or any other symptoms other than wanting to sleep more during the day, although he reports she still ws not sleeping much at night. He does report that the patient only recently started taking donepezil a few days ago. Then, last night, Simba gave her twice as much trazodone as usual (took 150mg last night) because. This morning, the patient's other son went to the house to check on her and found her with decreased mentation from her baseline and completely nonverbal. It is unclear whether or not this was an acute change since last night or if the patient has been this way the last few days as there are conflicting accounts from family but the visiting son decided to bring her to the ER.      Initially, the patient was " "found to be bradycardic to the 40s at [facility] but had a normal temp, was normotensive with normal RR. CT head was negative and patient was to be admitted for AMS workup. However, while waiting for transport, the patient developed hypotension and was started on dopamine and phenylephrine by the ER after central line placement.     History obtained by patient's son and primary caretaker, Simba as well as patient's granddaughter, Margarette and her father (the patient's other son) as the patient is non-verbal.     Social History: Patient lives with family members at home, who provide care for her, per SERINA Ramos.    Prior Intubation HX:  N/A    Modified Barium Swallow: N/A    Chest X-Rays: CT chest: Endotracheal tube tip mid trachea. No gross mediastinal lymphadenopathy or axillary lymphadenopathy. The thoracic aorta is normal caliber. There is a large region of consolidation involving the right lower lobe, concerning for pneumonia. A trace amount of left basilar atelectasis noted. Mild scattered centrilobular opacities noted in the right upper and middle lobes, probable additional pneumonitis/pneumonia. The tracheobronchial tree is clear.    Prior diet: Unknown at this time 2/2 pt's nonverbal with exception of perseverative "yeah" response.    Subjective     SLP consulted for clinical swallow eval. SLP confirmed with RN prior to entry. Pt found in bed awake upon SLP entry. Pt observed with dcr'd HELLEN 2/2 dementia dx. Pt nonverbal throughout session, with exception of stating "yeah" inconsistently x4.    Patient goals: None stated by pt at this time     Objective:     Oral Musculature Evaluation  · Oral Musculature:  (unable to assess 2/2 pt's dcr'd HELLEN )  · Dentition: present and adequate  · Mucosal Quality: adequate  · Volitional Swallow:  (unable to assess 2/2 pt's dcr'd HELLEN)  · Voice Prior to PO Intake:  (unable to assess 2/2 pt's dcr'd HELLEN)    Bedside Swallow Eval:    Pt seen for BSS this AM. Pt with weak cough " s/p swallow thin liquids x2 and inconsistent cough s/p swallow nectar thick liquids x3 with wet, gurgly voice noted during last trial. Pt appeared to tolerate puree textures with no overt s/s of aspiration. However, SLP cannot objectively r/o aspiration at bedside.       Consistencies Assessed:  · Thin liquids -via tsp x2, via cup x2  · Nectar thick liquids -via cup x6  · Puree -(pudding) x3     Oral Phase:   · Decreased closure around utensil  · Poor oral acceptance  · Slow oral transit time    Pharyngeal Phase:   · Weak coughing/choking-- s/p swallow thin liquids via cup x2 and nectar thick liquids x3  · decreased hyolaryngeal excursion to palpation- across all consistencies  · delayed swallow initiation/audible swallow- across all consistencies   · wet vocal quality after swallow- s/p swallow finial trial of nectar thick liquids    Compensatory Strategies  · Volitional cough/throat clear    Treatment: SLP educated pt on role of SLP, BSS, diet recs/swallow precautions and POC. However, pt with dcr'd HELLEN and dementia dx; pt will require frequent reinforcement.       Assessment:     Verna Carranza is a 78 y.o. female admitted with septic shock.  She presents with overt s/s of aspiration c/b weak, unproductive cough and wet, gurgly voice s/p swallow thin and nectar thick liquids. Pt appeared to tolerate puree textures at bedside with no overt s/s of aspiration. However, SLP cannot objectively r/o aspiration at bedside. SLP recs: Puree for pleasure only with RN or SLP, essential po meds ONLY buried in puree. SLP will f/u next date. SLP notified SERINA Miranda and MD of results/recs.    Goals:    SLP Goals        Problem: SLP Goal    Goal Priority Disciplines Outcome   SLP Goal     SLP Ongoing (interventions implemented as appropriate)   Description:  Short Term Goals:  1. Pt will participate in BSS to determine least restrictive diet.-ongoing  2. Pt will tolerate in pleasure feedings of puree textures with RN or SLP with  no overt s/s of aspiration.                        Plan:     · Patient to be seen:  3 x/week   · Plan of Care expires:  04/02/18  · Plan of Care reviewed with:  patient (SERINA Miranda and MD team)   · SLP Follow-Up:  Yes       Discharge recommendations:    TBD, pending pt's progress. SLP will continue to follow  Barriers to Discharge:  None    Time Tracking:     SLP Treatment Date:   03/03/18  Speech Start Time:  0900  Speech Stop Time:  0916     Speech Total Time (min):  16 min    Billable Minutes: Eval Swallow and Oral Function 16    JUSTICE Voss, CF-SLP  Speech-Language Pathologist   3/3/2018

## 2018-03-03 NOTE — PROGRESS NOTES
U Internal Medicine Resident HO-III Progress Note    Subjective:      No acute events overnight.  Remains hemodynamically stable with no acute episodes of agitation.     Objective:   Last 24 Hour Vital Signs:  BP  Min: 126/71  Max: 182/111  Temp  Av.3 °F (36.8 °C)  Min: 97.9 °F (36.6 °C)  Max: 98.9 °F (37.2 °C)  Pulse  Av.2  Min: 77  Max: 123  Resp  Av.4  Min: 8  Max: 33  SpO2  Av.8 %  Min: 98 %  Max: 100 %  Weight  Av.2 kg (141 lb 8.6 oz)  Min: 64.2 kg (141 lb 8.6 oz)  Max: 64.2 kg (141 lb 8.6 oz)  I/O last 3 completed shifts:  In: 3091.5 [I.V.:913.5; Blood:1198; NG/GT:130; IV Piggyback:850]  Out: 2855 [Urine:2805; Drains:50]    Physical Examination:  General: Alert and awake.  Non-verbal.  No eye contact  HENT:  NCAT; anicteric sclera; OP clear with MMM  Cardio:  Regular rate and rhythm with normal S1 and S2; no murmurs  Resp:  CTAB; respirations unlabored; occasional rhonchi  Abdom: Soft, NTND with normoactive bowel sounds  Extrem: WWP with no edema  Pulses: 2+ and symmetric distally  Neuro:  Awake.  MAEW.  PERRL.  EMOi.  Non-verbal.        Laboratory:  Laboratory Data Reviewed: yes  Pertinent Findings:  Lab Results   Component Value Date    WBC 12.57 2018    HGB 10.1 (L) 2018    HCT 28.5 (L) 2018    MCV 86 2018     (L) 2018     Lab Results   Component Value Date    CREATININE 0.9 2018    BUN 8 2018     2018    K 3.1 (L) 2018     (H) 2018    CO2 22 (L) 2018     Lab Results   Component Value Date     (H) 2018    AST 49 (H) 2018    ALKPHOS 53 (L) 2018    BILITOT 1.8 (H) 2018       Microbiology Data Reviewed: yes  Pertinent Findings:   Resp Cx: NG   BCx: NGTD    Other Results:  Radiology Data Reviewed: yes  Pertinent Findings:  - NO NEW IMAGING      Current Medications:     Infusions:   dextrose 5 % and 0.45 % NaCl 70 mL/hr at 18 0757        Scheduled:    dextrose 50%  25 g Intravenous Once    levetiracetam IVPB  1,500 mg Intravenous Q12H    oseltamivir  30 mg Oral BID    pantoprazole  40 mg Intravenous BID    piperacillin-tazobactam (ZOSYN) IVPB  4.5 g Intravenous Q8H    vancomycin (VANCOCIN) IVPB  15 mg/kg Intravenous Q24H        PRN:  sodium chloride, sodium chloride, dextrose 50%, dextrose 50%, glucagon (human recombinant), glucose, glucose, hydrALAZINE, lorazepam      Assessment:     Verna Carranza is a 78 y.o.female with  Patient Active Problem List    Diagnosis Date Noted    Acidosis 03/01/2018    Shock 03/01/2018    Seizure 02/28/2018    Septic shock 02/27/2018    Acute respiratory failure with hypoxia and hypercapnia 02/27/2018    Acute encephalopathy 02/27/2018    Pneumonia of right lower lobe due to infectious organism 02/27/2018    AMY (acute kidney injury) 02/27/2018    CKD (chronic kidney disease), stage III 02/27/2018    Transaminitis 02/27/2018    Elevated troponin 02/27/2018    Advanced dementia 02/27/2018    Bradycardia     Altered mental state 02/26/2018        Plan:     Neuro:  - Alert and awake off sedation.  Non-purposeful movements, not following commands  - Neuro consulted.  CT head with stable cerebral atrophy  - EEG without focal seizure, diffuse slowing. continue keppra to 1500 BID  - Above findings are concerning for poor prognosis, kameron in setting of baseline dementia and prolonged ICU stay     CVS:  - Hemodynamically stable off pressors. ECHO with DD and PHTN     Resp:  - concern for aspiration event initially.  Now extubated to NC, sating well     FEN/GI:  - Persistent hypoglycemic episodes.  AM cortisol WNL.  Glucoses now improved  - Continue aggressive electrolyte repletion as needed     Heme/ID:  - Remains afebrile.  UCx, BCx, RCx all NGTD  - Continue empiric Tamiflu, Vanc and Zosyn  - S/p 2U pRBCs, 1U platelets after bleeding from CVC site.    - Hgb stable.  Hemolysis labs neg     Renal:  - Cr remains stable.   Avoiding nephrotoxins     Endo:  - episodes of persistent hypoglycemia improved.  Continue D5LR     CODE:  DNR  VTE PPx:  None 2/2 bleeding and thrombocytopenia     Disposition:  Pending ongoing ICU care and GOC discussion with family    Lj Hawkins  [facility] Internal Medicine HO-I  U IM Service Team A    [facility] Medicine Hospitalist Pager numbers:   [facility] Hospitalist Medicine Team A (Pepe/Nandini): [phone]  [facility] Hospitalist Medicine Team B (Maribell/Rhonda):  [phone]

## 2018-03-03 NOTE — PLAN OF CARE
Problem: Patient Care Overview  Goal: Plan of Care Review  Outcome: Ongoing (interventions implemented as appropriate)  Pt seen by speech today. Pureed diet ordered, pt vicenta well. Pt turned q 2 hours to prevent skin breakdown. Plan for pt to be seen by PT and OT tomorrow. Plan of care reviewed with family.

## 2018-03-03 NOTE — PLAN OF CARE
Problem: SLP Goal  Goal: SLP Goal  Short Term Goals:  1. Pt will participate in BSS to determine least restrictive diet.-ongoing  2. Pt will tolerate in pleasure feedings of puree textures with RN or SLP with no overt s/s of aspiration.      Outcome: Ongoing (interventions implemented as appropriate)  3/3/18: Pt seen for BSS this AM. Pt with weak cough s/p swallow thin liquids and inconsistent cough s/p swallow nectar thick liquids. Pt appeared to tolerate puree textures with no overt s/s of aspiration. However, SLP cannot objectively r/o aspiration at bedside. SLP recs: Puree for pleasure only with RN or SLP, essential po meds ONLY buried in puree. SLP will f/u next date. SLP notified SERINA Miranda and MD of results/recs.  PAVAN Voss., CF-SLP  Speech-Language Pathologist

## 2018-03-03 NOTE — PROGRESS NOTES
"Vancomycin Dosing and Monitoring Pharmacy Protocol    Verna Carranza is a 78 y.o. female    Height: 5' 2" (1.575 m)   Wt Readings from Last 1 Encounters:   03/03/18 64.2 kg (141 lb 8.6 oz)     Ideal body weight: 50.1 kg (110 lb 7.2 oz)  Adjusted ideal body weight: 55.7 kg (122 lb 14.2 oz)    Temp Readings from Last 3 Encounters:   03/03/18 98.6 °F (37 °C) (Axillary)   03/12/15 98.6 °F (37 °C) (Oral)   06/26/14 98.7 °F (37.1 °C) (Oral)      Lab Results   Component Value Date/Time    WBC 11.03 03/03/2018 06:16 AM    WBC 12.57 03/03/2018 12:38 AM    WBC 7.50 03/02/2018 04:25 PM      Lab Results   Component Value Date/Time    CREATININE 0.9 03/03/2018 06:16 AM    CREATININE 1.0 03/02/2018 04:15 AM    CREATININE 1.3 03/01/2018 04:35 AM        Serum creatinine: 0.9 mg/dL 03/03/18 0616  Estimated creatinine clearance: 45.3 mL/min    Antibiotics       Start     Stop Route Frequency Ordered    03/02/18 1730  vancomycin 1 g in 0.9% sodium chloride 250 mL IVPB (ready to mix system)  (Vancomycin IVPB with levels panel)      -- IV Every 24 hours (non-standard times) 03/02/18 1427    03/02/18 1426  piperacillin-tazobactam 4.5 g in dextrose 5 % 100 mL IVPB (ready to mix system)      -- IV Every 8 hours (non-standard times) 03/02/18 1427          Antifungals       None            Microbiology Results (last 7 days)       Procedure Component Value Units Date/Time    Blood culture [[id number]] Collected:  02/26/18 1630    Order Status:  Completed Specimen:  Blood from Line, Jugular, Internal Left Updated:  03/02/18 2212     Blood Culture, Routine No Growth to date     Blood Culture, Routine No Growth to date     Blood Culture, Routine No Growth to date     Blood Culture, Routine No Growth to date     Blood Culture, Routine No Growth to date    Blood culture [[id number]] Collected:  02/26/18 1645    Order Status:  Completed Specimen:  Blood from Line, Jugular, Internal Left Updated:  03/02/18 3718     Blood Culture, Routine No Growth to " date     Blood Culture, Routine No Growth to date     Blood Culture, Routine No Growth to date     Blood Culture, Routine No Growth to date     Blood Culture, Routine No Growth to date    Culture, Respiratory with Gram Stain [[id number]] Collected:  18 1413    Order Status:  Completed Specimen:  Respiratory from Endotracheal Aspirate Updated:  18 1246     Respiratory Culture Normal respiratory edwin     Gram Stain (Respiratory) <10 epithelial cells per low power field.     Gram Stain (Respiratory) Rare WBC's     Gram Stain (Respiratory) No organisms seen    Urine culture [[id number]] Collected:  18    Order Status:  Completed Specimen:  Urine from Catheterized Updated:  18 2205     Urine Culture, Routine No significant growth    Urine culture [[id number]]     Order Status:  Completed Specimen:  Urine     Urine culture [[id number]]     Order Status:  Canceled Specimen:  Urine from Urine, Catheterized             Indication:   Sepsis    Target Level: 15-20 mcg/ml    Hemodialysis:   No on N/A    Dosing Weight:   ABW--actual body weight  If ABW is greater than or equal to 30% over Ideal Body Weight, AdjBW will be used to calculate vancomycin dose.    Last Vancomycin dose: 750 mg   Date/Time given: 2018  2030          Vancomycin level:  No results for input(s): VANCOMYCIN-TROUGH in the last 72 hours.  Recent Labs   Lab Result Units  18   1625   Vancomycin, Random ug/mL  6.6       Per Protocol Initial/Adjustments Dosin. Initial/Adjustment Dose: INCREASE Vancomycin will be adjusted from 750mg q48hr to 1000mg q24hr  2. Vancomycin Trough Level will be drawn on 2018  1700 date/time    Pharmacy will continue to follow.    Please contact if you have any further questions. Thank you.    Caitlyn Chacko, PharmD  [phone]

## 2018-03-04 PROBLEM — E87.6 HYPOKALEMIA: Status: ACTIVE | Noted: 2018-03-04

## 2018-03-04 PROBLEM — E83.42 HYPOMAGNESEMIA: Status: ACTIVE | Noted: 2018-03-04

## 2018-03-04 LAB
ALBUMIN SERPL BCP-MCNC: 2.2 G/DL
ALP SERPL-CCNC: 48 U/L
ALT SERPL W/O P-5'-P-CCNC: 83 U/L
ANION GAP SERPL CALC-SCNC: 7 MMOL/L
AST SERPL-CCNC: 38 U/L
BASOPHILS # BLD AUTO: 0 K/UL
BASOPHILS # BLD AUTO: 0.01 K/UL
BASOPHILS NFR BLD: 0 %
BASOPHILS NFR BLD: 0.1 %
BILIRUB SERPL-MCNC: 1.8 MG/DL
BUN SERPL-MCNC: 4 MG/DL
CALCIUM SERPL-MCNC: 8.1 MG/DL
CHLORIDE SERPL-SCNC: 115 MMOL/L
CO2 SERPL-SCNC: 22 MMOL/L
CREAT SERPL-MCNC: 0.8 MG/DL
DIFFERENTIAL METHOD: ABNORMAL
EOSINOPHIL # BLD AUTO: 0.1 K/UL
EOSINOPHIL # BLD AUTO: 0.2 K/UL
EOSINOPHIL NFR BLD: 1.1 %
EOSINOPHIL NFR BLD: 1.5 %
EOSINOPHIL NFR BLD: 1.7 %
EOSINOPHIL NFR BLD: 1.9 %
ERYTHROCYTE [DISTWIDTH] IN BLOOD BY AUTOMATED COUNT: 16 %
ERYTHROCYTE [DISTWIDTH] IN BLOOD BY AUTOMATED COUNT: 16 %
ERYTHROCYTE [DISTWIDTH] IN BLOOD BY AUTOMATED COUNT: 16.1 %
ERYTHROCYTE [DISTWIDTH] IN BLOOD BY AUTOMATED COUNT: 16.2 %
EST. GFR  (AFRICAN AMERICAN): >60 ML/MIN/1.73 M^2
EST. GFR  (NON AFRICAN AMERICAN): >60 ML/MIN/1.73 M^2
GLUCOSE SERPL-MCNC: 105 MG/DL
HCT VFR BLD AUTO: 25.3 %
HCT VFR BLD AUTO: 25.5 %
HCT VFR BLD AUTO: 26.1 %
HCT VFR BLD AUTO: 26.2 %
HGB BLD-MCNC: 8.6 G/DL
HGB BLD-MCNC: 8.7 G/DL
HGB BLD-MCNC: 8.8 G/DL
HGB BLD-MCNC: 9.1 G/DL
LYMPHOCYTES # BLD AUTO: 1 K/UL
LYMPHOCYTES # BLD AUTO: 1.1 K/UL
LYMPHOCYTES # BLD AUTO: 1.1 K/UL
LYMPHOCYTES # BLD AUTO: 1.2 K/UL
LYMPHOCYTES NFR BLD: 11.4 %
LYMPHOCYTES NFR BLD: 8.8 %
LYMPHOCYTES NFR BLD: 9.4 %
LYMPHOCYTES NFR BLD: 9.5 %
MCH RBC QN AUTO: 29.9 PG
MCH RBC QN AUTO: 30 PG
MCH RBC QN AUTO: 30.2 PG
MCH RBC QN AUTO: 30.4 PG
MCHC RBC AUTO-ENTMCNC: 33.3 G/DL
MCHC RBC AUTO-ENTMCNC: 34 G/DL
MCHC RBC AUTO-ENTMCNC: 34.5 G/DL
MCHC RBC AUTO-ENTMCNC: 34.7 G/DL
MCV RBC AUTO: 87 FL
MCV RBC AUTO: 88 FL
MCV RBC AUTO: 88 FL
MCV RBC AUTO: 91 FL
MONOCYTES # BLD AUTO: 0.8 K/UL
MONOCYTES # BLD AUTO: 0.9 K/UL
MONOCYTES # BLD AUTO: 1 K/UL
MONOCYTES # BLD AUTO: 1 K/UL
MONOCYTES NFR BLD: 6.8 %
MONOCYTES NFR BLD: 8.2 %
MONOCYTES NFR BLD: 8.7 %
MONOCYTES NFR BLD: 9 %
NEUTROPHILS # BLD AUTO: 7.9 K/UL
NEUTROPHILS # BLD AUTO: 8.6 K/UL
NEUTROPHILS # BLD AUTO: 9.5 K/UL
NEUTROPHILS # BLD AUTO: 9.9 K/UL
NEUTROPHILS NFR BLD: 78.1 %
NEUTROPHILS NFR BLD: 79.2 %
NEUTROPHILS NFR BLD: 80.9 %
NEUTROPHILS NFR BLD: 82.7 %
OVALOCYTES BLD QL SMEAR: ABNORMAL
PLATELET # BLD AUTO: 101 K/UL
PLATELET # BLD AUTO: 108 K/UL
PLATELET # BLD AUTO: 99 K/UL
PLATELET # BLD AUTO: 99 K/UL
PLATELET BLD QL SMEAR: ABNORMAL
PMV BLD AUTO: 10.4 FL
PMV BLD AUTO: 10.9 FL
PMV BLD AUTO: 11.5 FL
PMV BLD AUTO: 11.8 FL
POCT GLUCOSE: 114 MG/DL (ref 70–110)
POCT GLUCOSE: 120 MG/DL (ref 70–110)
POCT GLUCOSE: 99 MG/DL (ref 70–110)
POIKILOCYTOSIS BLD QL SMEAR: SLIGHT
POLYCHROMASIA BLD QL SMEAR: ABNORMAL
POTASSIUM SERPL-SCNC: 3 MMOL/L
PROT SERPL-MCNC: 4.8 G/DL
RBC # BLD AUTO: 2.88 M/UL
RBC # BLD AUTO: 2.88 M/UL
RBC # BLD AUTO: 2.93 M/UL
RBC # BLD AUTO: 2.99 M/UL
SODIUM SERPL-SCNC: 144 MMOL/L
TARGETS BLD QL SMEAR: ABNORMAL
WBC # BLD AUTO: 10.05 K/UL
WBC # BLD AUTO: 10.81 K/UL
WBC # BLD AUTO: 11.78 K/UL
WBC # BLD AUTO: 11.97 K/UL

## 2018-03-04 PROCEDURE — G8979 MOBILITY GOAL STATUS: HCPCS | Mod: CL

## 2018-03-04 PROCEDURE — 25000003 PHARM REV CODE 250: Performed by: INTERNAL MEDICINE

## 2018-03-04 PROCEDURE — 25000003 PHARM REV CODE 250: Performed by: STUDENT IN AN ORGANIZED HEALTH CARE EDUCATION/TRAINING PROGRAM

## 2018-03-04 PROCEDURE — 94761 N-INVAS EAR/PLS OXIMETRY MLT: CPT

## 2018-03-04 PROCEDURE — 85025 COMPLETE CBC W/AUTO DIFF WBC: CPT | Mod: 91

## 2018-03-04 PROCEDURE — G8988 SELF CARE GOAL STATUS: HCPCS | Mod: CM

## 2018-03-04 PROCEDURE — G8978 MOBILITY CURRENT STATUS: HCPCS | Mod: CM

## 2018-03-04 PROCEDURE — 97530 THERAPEUTIC ACTIVITIES: CPT

## 2018-03-04 PROCEDURE — 80053 COMPREHEN METABOLIC PANEL: CPT

## 2018-03-04 PROCEDURE — 97165 OT EVAL LOW COMPLEX 30 MIN: CPT

## 2018-03-04 PROCEDURE — 63600175 PHARM REV CODE 636 W HCPCS: Performed by: STUDENT IN AN ORGANIZED HEALTH CARE EDUCATION/TRAINING PROGRAM

## 2018-03-04 PROCEDURE — G8987 SELF CARE CURRENT STATUS: HCPCS | Mod: CN

## 2018-03-04 PROCEDURE — 11000001 HC ACUTE MED/SURG PRIVATE ROOM

## 2018-03-04 PROCEDURE — 97166 OT EVAL MOD COMPLEX 45 MIN: CPT

## 2018-03-04 PROCEDURE — 63600175 PHARM REV CODE 636 W HCPCS: Performed by: INTERNAL MEDICINE

## 2018-03-04 PROCEDURE — 36415 COLL VENOUS BLD VENIPUNCTURE: CPT

## 2018-03-04 PROCEDURE — C9113 INJ PANTOPRAZOLE SODIUM, VIA: HCPCS | Performed by: STUDENT IN AN ORGANIZED HEALTH CARE EDUCATION/TRAINING PROGRAM

## 2018-03-04 PROCEDURE — 97162 PT EVAL MOD COMPLEX 30 MIN: CPT

## 2018-03-04 PROCEDURE — 92526 ORAL FUNCTION THERAPY: CPT

## 2018-03-04 RX ORDER — MOXIFLOXACIN HYDROCHLORIDE 400 MG/1
400 TABLET ORAL DAILY
Status: DISCONTINUED | OUTPATIENT
Start: 2018-03-04 | End: 2018-03-06 | Stop reason: HOSPADM

## 2018-03-04 RX ORDER — DEXTROSE, SODIUM CHLORIDE, SODIUM LACTATE, POTASSIUM CHLORIDE, AND CALCIUM CHLORIDE 5; .6; .31; .03; .02 G/100ML; G/100ML; G/100ML; G/100ML; G/100ML
INJECTION, SOLUTION INTRAVENOUS CONTINUOUS
Status: ACTIVE | OUTPATIENT
Start: 2018-03-04 | End: 2018-03-05

## 2018-03-04 RX ORDER — POTASSIUM CHLORIDE 20 MEQ/15ML
20 SOLUTION ORAL EVERY 4 HOURS
Status: DISCONTINUED | OUTPATIENT
Start: 2018-03-04 | End: 2018-03-04

## 2018-03-04 RX ORDER — MAGNESIUM SULFATE HEPTAHYDRATE 40 MG/ML
2 INJECTION, SOLUTION INTRAVENOUS ONCE
Status: COMPLETED | OUTPATIENT
Start: 2018-03-04 | End: 2018-03-04

## 2018-03-04 RX ORDER — POTASSIUM CHLORIDE 7.45 MG/ML
10 INJECTION INTRAVENOUS ONCE
Status: COMPLETED | OUTPATIENT
Start: 2018-03-04 | End: 2018-03-04

## 2018-03-04 RX ADMIN — MOXIFLOXACIN HYDROCHLORIDE 400 MG: 400 TABLET, FILM COATED ORAL at 05:03

## 2018-03-04 RX ADMIN — HYDRALAZINE HYDROCHLORIDE 10 MG: 20 INJECTION INTRAMUSCULAR; INTRAVENOUS at 07:03

## 2018-03-04 RX ADMIN — PANTOPRAZOLE SODIUM 40 MG: 40 INJECTION, POWDER, FOR SOLUTION INTRAVENOUS at 08:03

## 2018-03-04 RX ADMIN — POTASSIUM CHLORIDE 10 MEQ: 10 INJECTION, SOLUTION INTRAVENOUS at 09:03

## 2018-03-04 RX ADMIN — PANTOPRAZOLE SODIUM 40 MG: 40 INJECTION, POWDER, FOR SOLUTION INTRAVENOUS at 09:03

## 2018-03-04 RX ADMIN — LEVETIRACETAM 1500 MG: 15 INJECTION INTRAVENOUS at 08:03

## 2018-03-04 RX ADMIN — PIPERACILLIN AND TAZOBACTAM 4.5 G: 4; .5 INJECTION, POWDER, LYOPHILIZED, FOR SOLUTION INTRAVENOUS; PARENTERAL at 08:03

## 2018-03-04 RX ADMIN — MAGNESIUM SULFATE IN WATER 2 G: 40 INJECTION, SOLUTION INTRAVENOUS at 09:03

## 2018-03-04 RX ADMIN — OSELTAMIVIR PHOSPHATE 30 MG: 6 POWDER, FOR SUSPENSION ORAL at 08:03

## 2018-03-04 RX ADMIN — POTASSIUM CHLORIDE 20 MEQ: 20 SOLUTION ORAL at 09:03

## 2018-03-04 RX ADMIN — LEVETIRACETAM 1500 MG: 15 INJECTION INTRAVENOUS at 09:03

## 2018-03-04 NOTE — PLAN OF CARE
Problem: Occupational Therapy Goal  Goal: Occupational Therapy Goal  Goals to be met by: 04/04/18     Patient will increase functional independence with ADLs by performing:    Sitting at edge of bed x15 minutes with Supervision.  Supine to sit with Minimal Assistance.  Toilet transfer to bedside commode with Minimal Assistance.     Outcome: Ongoing (interventions implemented as appropriate)  OT melyssa completed w/ PT this date. Pt w/ mid stage Alzheimer's disease & per phone contact w/ son, req'ed A w/ all fxnl mobility & tasks. At PLOF, pt able to: verbally respond yes/no to simple questions re: needs/wants, raise limbs to A w/ dressing, maint static sitting balance at EOB & amb w/ in home via HHA/furniture cruising w/o DME.  Pt currently perf rolling to L w/ Max A; sup-->EOB w/ Max-total A; vicenta'd sitting EOB x 15min w/ mod A for occasional L lean & CGA through thighs to keep feet on floor & prevent post lean. Pt able to open eyes in response to name, but demo'ed incomprehensible mumbling occasionally in response to questions. Mostly unable to follow simple commands or acknowlege/use items correctly for G/H. Perf static standing x 2 attempts w/ Min HHA x 2 for ~30sec per. Req'ed total A x 2 for return to supine.    Pt presents w/ decreased overall endurance/conditioning, balance/mobility & coordination/cognition w/ subsequent decline in (I)/safety w/ BADLs, fxnl mobility & fxnl t/f's. OT 3x/wk to maximize phys/fxnl potential & edu/tx fly re: HEP, DME, positioning & safe/proper techs for A/cg'ing.

## 2018-03-04 NOTE — PROGRESS NOTES
[facility] IM Resident HO-4 Progress Note    Subjective:      Verna Carranza is a 78 y.o.  female who is being followed by the [facility] IM service at [facility] for septic shock 2/2 aspiration pneumonia and end-stage dementia. The patient continues to do well s/p extubation.  She was eating apple sauce this morning and says words occasionally.  No acute events overnight.     Objective:   Last 24 Hour Vital Signs:  BP  Min: 123/79  Max: 181/96  Temp  Av.1 °F (36.7 °C)  Min: 97.7 °F (36.5 °C)  Max: 98.5 °F (36.9 °C)  Pulse  Av  Min: 69  Max: 89  Resp  Av.3  Min: 0  Max: 26  SpO2  Av.7 %  Min: 97 %  Max: 100 %  I/O last 3 completed shifts:  In: 4095 [I.V.:3145; IV Piggyback:950]  Out: 2655 [Urine:2655]    Physical Examination:   General:          Alert and awake.  Non-verbal.  No eye contact  HENT:             NCAT; anicteric sclera; OP clear with MMM  Cardio:             Regular rate and rhythm with normal S1 and S2; no murmurs  Resp:               CTAB; respirations unlabored; occasional rhonchi  Abdom:            Soft, NTND with normoactive bowel sounds  Extrem:            WWP with no edema  Pulses:            2+ and symmetric distally  Neuro:             Awake.  MAEW.  PERRL.  EMOi.  Non-verbal.     Laboratory:  Laboratory Data Reviewed: yes  Pertinent Findings:  Recent Labs      18   0616  18   1237  18   1757  18   0012  18   0618   WBC  11.03  11.96  11.50  11.78  10.05   HGB  10.1*  9.8*  9.4*  9.1*  8.8*   HCT  28.5*  28.1*  27.1*  26.2*  25.5*   PLT  108*  100*  105*  99*  101*   MCV  86  88  88  88  87   RDW  16.3*  16.2*  16.3*  16.2*  16.0*   GRAN  74.9*  8.2*  78.1*  9.3*  76.0*  80.9*  9.5*  78.1*  7.9*   LYMPH  14.6*  1.6  12.4*  1.5  12.0*  CANCELED  9.4*  1.1  11.4*  1.2   MONO  9.7  1.1*  8.7  1.0  7.0  CANCELED  8.2  1.0  8.7  0.9   EOS  0.1  0.1  CANCELED  0.1  0.2   BASO  0.00  0.00  CANCELED  0.00  0.00   EOSINOPHIL  0.8  0.8   1.0  1.1  1.5   BASOPHIL  0.0  0.0  0.0  0.0  0.0     Recent Labs      03/02/18   0415  03/03/18   0616  03/04/18   0618   NA  139  144  144   K  3.8  3.1*  3.0*   CL  112*  114*  115*   CO2  21*  22*  22*   BUN  13  8  4*   CREATININE  1.0  0.9  0.8   GLU  67*  92  105     Recent Labs      03/02/18   2233  03/03/18   1147  03/03/18   1747  03/03/18   2138  03/04/18   0757   POCTGLUCOSE  103  113*  113*  110  114*     Recent Labs      03/02/18   0415  03/03/18   0616  03/04/18   0618   PROT  4.9*  5.1*  4.8*   ALBUMIN  2.4*  2.3*  2.2*   BILITOT  1.6*  1.8*  1.8*   AST  61*  49*  38   ALT  116*  101*  83*   ALKPHOS  47*  53*  48*         Microbiology Data Reviewed: yes  Pertinent Findings:  No growth    Other Results:  EKG (my interpretation): no new.    Radiology Data Reviewed: yes  Pertinent Findings:  No new    Current Medications:     Infusions:       Scheduled:   dextrose 50%  25 g Intravenous Once    levetiracetam IVPB  1,500 mg Intravenous Q12H    magnesium sulfate IVPB  2 g Intravenous Once    oseltamivir  30 mg Oral BID    pantoprazole  40 mg Intravenous BID    piperacillin-tazobactam (ZOSYN) IVPB  4.5 g Intravenous Q8H    potassium chloride 10%  20 mEq Oral Q4H    vancomycin (VANCOCIN) IVPB  15 mg/kg Intravenous Q24H        PRN:  sodium chloride, sodium chloride, dextrose 50%, dextrose 50%, glucagon (human recombinant), glucose, glucose, hydrALAZINE, lorazepam    Antibiotics and Day Number of Therapy:  Vanc, Zosyn day 7    Lines and Day Number of Therapy:  piv    Assessment:     Verna Carranza is a 78 y.o.female with  Patient Active Problem List    Diagnosis Date Noted    Acidosis 03/01/2018    Shock 03/01/2018    Seizure 02/28/2018    Septic shock 02/27/2018    Acute respiratory failure with hypoxia and hypercapnia 02/27/2018    Acute encephalopathy 02/27/2018    Pneumonia of right lower lobe due to infectious organism 02/27/2018    AMY (acute kidney injury) 02/27/2018    CKD (chronic  kidney disease), stage III 02/27/2018    Transaminitis 02/27/2018    Elevated troponin 02/27/2018    Advanced dementia 02/27/2018    Bradycardia     Altered mental state 02/26/2018        Plan:   Neuro:  - Alert and awake off sedation.  moves around, occasionally verbal, not following commands  - Neuro consulted.  CT head with stable cerebral atrophy; MRI with signs of normal pressure hydrocephalus and cerebral atrophy  - EEG without focal seizure, diffuse slowing. continue keppra to 1500 BID  - Above findings are concerning for poor prognosis, kameron in setting of baseline dementia and prolonged ICU stay     CVS:  - Hemodynamically stable off pressors. ECHO with DD and PHTN     Resp:  - concern for aspiration event initially.  Now extubated to NC, sating well on room air currently     FEN/GI:  - Persistent hypoglycemic episodes.  AM cortisol WNL.  Glucoses now improved  - Continue aggressive electrolyte repletion as needed     Heme/ID:  - Remains afebrile.  UCx, BCx, RCx all NGTD  - Continue empiric Tamiflu, Vanc and Zosyn; deescalate to augementin today vs moxifloxacin  - S/p 2U pRBCs, 1U platelets after bleeding from CVC site.    - Hgb stable.  Hemolysis labs neg     Renal:  - Cr remains stable.  Avoiding nephrotoxins     Endo:  - episodes of persistent hypoglycemia improved.  Continue D5LR     CODE:  DNR  VTE PPx:  None 2/2 bleeding and thrombocytopenia      Disposition:  step down to the floor today; will discuss goals of care with family today vs tomorrow      Elsa Santillan  [facility] Internal Medicine HO-4  [facility] IM Service Team A    [facility] Medicine Hospitalist Pager numbers:   [facility] Hospitalist Medicine Team A (Pepe/Nandini): [phone]  [facility] Hospitalist Medicine Team B (Maribell/Rhonda):  [phone]

## 2018-03-04 NOTE — PT/OT/SLP EVAL
Physical Therapy Evaluation    Patient Name:  Verna Carranza   MRN:  [id number]    Recommendations:     Discharge Recommendations:  home health PT, home health OT   Discharge Equipment Recommendations: bedside commode, hospital bed   Barriers to discharge: None    Assessment:     Verna Carranza is a 78 y.o. female admitted with a medical diagnosis of Septic shock.  She presents with the following impairments/functional limitations:  weakness, impaired endurance, impaired self care skills, impaired balance, impaired functional mobilty, gait instability, visual deficits, impaired cognition. Pt tolerated sitting EOB for ~15 minutes requiring Mod-CGA. Pt stood X1 with Min A X2 and HHA. Pt would benefit from continued acute care PT services as well as HHPT/OT to improve current impairments and return to a more independent functional mobility level.     Rehab Prognosis:  Good-; patient would benefit from acute skilled PT services to address these deficits and reach maximum level of function.      Recent Surgery: * No surgery found *      Plan:     During this hospitalization, patient to be seen 3 x/week to address the above listed problems via gait training, therapeutic activities, therapeutic exercises  · Plan of Care Expires:  04/03/18   Plan of Care Reviewed with: patient, son    Subjective     Communicated with SERINA Negro prior to session.  Patient found sitting EOB with FREDY Woodall and SERINA Negro in room upon PT entry to room, agreeable to evaluation.      Chief Complaint: none verbalized by pt  Patient comments/goals: none verbalized by pt.   Pain/Comfort:  Pain Rating 1:  (none reported)  Pain Rating Post-Intervention 1:  (None reported)    Patients cultural, spiritual, Alevism conflicts given the current situation: None verbalized to PT    Living Environment:  PT called son and the following information was obtained: Pt lives with her son and has a tub transfer bench. He states PTA she was able to ambulate short  "distances with supervision, he denies and recent falls/near falls but states she does hold onto walls and furniture. She required assistance for bathing, dressing, and feeding. Son states "she gets slower and slower every birthday."  Son states his mother could communicate her basic wants and needs PTA.  Patient has the following equipment: bath bench.  DME owned (not currently used): none.  Upon discharge, patient will have assistance from her family.    Objective:     Patient found with:  (ICU Monitoring)     General Precautions: Standard, fall, aspiration   Orthopedic Precautions:N/A   Braces: N/A     Exams:  · Cognitive Exam:  Patient only responded to her name being called and follows commands less than 10% of the time.    · Gross Motor Coordination:  impaired  · Skin Integrity/Edema:      · -       Skin integrity: Bruising of B flank area, L lateral cervical spine.  · RLE ROM: PROM WFL  · RLE Strength: unable to formally assess 2* to pt's impaired cognition. Based on clinical observation pt is at least 3/5.   · LLE ROM: PROM WFL  · LLE Strength: unable to formally assess 2* to pt's impaired cognition. Based on clinical observation pt is at least 3/5.     Functional Mobility:  · Bed Mobility:     · Rolling Left:  maximal assistance, total assistance and of 2 persons  · Rolling Right: maximal assistance, total assistance and of 2 persons  · Sit to Supine: total assistance and of 2 persons  · Transfers:     · Sit to Stand:  minimum assistance and of 2 persons with HHA  · Balance: Sitting: Fair-, Standing: Poor    AM-PAC 6 CLICK MOBILITY  Total Score:11       Therapeutic Activities and Exercises:   -Pt sat EOB for ~15 minutes requiring Mod A to CGA to maintain balance. Improved upright posture noted when downward presser was given to B knees.     Patient left left sidelying with all lines intact, call button in reach, bed alarm on and SERINA Negro notified.    GOALS:    Physical Therapy Goals        Problem: " Physical Therapy Goal    Goal Priority Disciplines Outcome Goal Variances Interventions   Physical Therapy Goal     PT/OT, PT Ongoing (interventions implemented as appropriate)                 1) Sup <>sit wit Min A   2) Sit <>Stand with Supervision  3) Sit EOB for at least 10 minutes with supervision.   4) Ambulate 50 feet with HHA X1.   5)Pt's family to properly and safely demonstrate transfers and ambulation.     History:     Past Medical History:   Diagnosis Date    Alzheimer disease     Hyperlipidemia     Hypertension        History reviewed. No pertinent surgical history.    Clinical Decision Making:     History  Co-morbidities and personal factors that may impact the plan of care Examination  Body Structures and Functions, activity limitations and participation restrictions that may impact the plan of care Clinical Presentation   Decision Making/ Complexity Score   Co-morbidities:   [x] Time since onset of injury / illness / exacerbation  [x] Status of current condition  [x]Patient's cognitive status and safety concerns    [] Multiple Medical Problems (see med hx)  Personal Factors:   [x] Patient's age  [x] Prior Level of function   [] Patient's home situation (environment and family support)  [] Patient's level of motivation  [] Expected progression of patient      HISTORY:(criteria)    [] 76266 - no personal factors/history    [] 30879 - has 1-2 personal factor/comorbidity     [x] 89436 - has >3 personal factor/comorbidity     Body Regions:  [x] Objective examination findings  [] Head     []  Neck  [] Trunk   [x] Upper Extremity  [x] Lower Extremity    Body Systems:  [x] For communication ability, affect, cognition, language, and learning style: the assessment of the ability to make needs known, consciousness, orientation (person, place, and time), expected emotional /behavioral responses, and learning preferences (eg, learning barriers, education  needs)  [] For the neuromuscular system: a general  assessment of gross coordinated movement (eg, balance, gait, locomotion, transfers, and transitions) and motor function  (motor control and motor learning)  [x] For the musculoskeletal system: the assessment of gross symmetry, gross range of motion, gross strength, height, and weight  [x] For the integumentary system: the assessment of pliability(texture), presence of scar formation, skin color, and skin integrity  [] For cardiovascular/pulmonary system: the assessment of heart rate, respiratory rate, blood pressure, and edema     Activity limitations:    [x] Patient's cognitive status and saf ety concerns          [] Status of current condition      [] Weight bearing restriction  [] Cardiopulmunary Restriction    Participation Restrictions:   [] Goals and goal agreement with the patient     [] Rehab potential (prognosis) and probable outcome      Examination of Body System: (criteria)    [] 76839 - addressing 1-2 elements    [] 64744 - addressing a total of 3 or more elements     [x] 36857 -  Addressing a total of 4 or more elements         Clinical Presentation: (criteria)  Evolving - 29414     On examination of body system using standardized tests and measures patient presents with 4 or more elements from any of the following: body structures and functions, activity limitations, and/or participation restrictions.  Leading to a clinical presentation that is considered evolving with changing characteristics                              Clinical Decision Making  (Eval Complexity):  Moderate - 11477     Time Tracking:     PT Received On: 03/04/18  PT Start Time: 0907     PT Stop Time: 0933  PT Total Time (min): 26 min     Billable Minutes: Evaluation 10, Therapeutic Activity: 16      Simone Canchola PT, DPT  03/04/2018

## 2018-03-04 NOTE — PT/OT/SLP PROGRESS
"Speech Language Pathology Treatment    Patient Name:  Verna Carranza   MRN:  [id number]  Admitting Diagnosis: Septic shock    Recommendations:                 General Recommendations:  Dysphagia therapy  Diet recommendations:  Pleasure Feeding, Puree, Liquid Diet Level: NPO   Aspiration Precautions: Fed only by RN or SLP, eessential PO meds in crushed in puree only, Feed only when awake/alert, Frequent oral care, Puree for pleasure and Standard aspiration precautions   General Precautions: Standard, aspiration, fall, NPO, other (see comments) (pleasure feeding of tsp bites of puree consistencies)  Communication strategies:  none and pt with dcr'd HELLEN 2/2 dementia. Pt states "yeah" to all questions during session    Subjective     SLP confirmed with RN prior to entry. Pt found in bed awake upon SLP entry. Pt observed with dcr'd HELLEN 2/2 dementia dx. Pt nonverbal throughout session--unable to follow basic commands. SLP assisted PCT with cleaning pt.     Pain/Comfort:  · Pain Rating 1: 0/10    Objective:     Has the patient been evaluated by SLP for swallowing?   Yes  Keep patient NPO? Yes   Current Respiratory Status: room air      Pt repositioned to 90 degree angle in bed for ongoing BSS. Poor oral acceptance and slow oral transit time noted with all consistencies (PO trials: thin liquids, nectar thickened liquids, and puree consistencies). Decreased hyolaryngeal elevation/excursion, delayed trigger of swallow, and audible swallowing noted with all consistencies. Weak coughing noted after PO trials of thin liquids and wet/gurgly vocal quality noted with PO trials of nectar thickened liquids. No overt s/s of aspiration or airway threat noted with small tsp bites of puree consistencies.    Assessment:     Verna Carranza is a 78 y.o. female with an SLP diagnosis of mod-severe oropharyngeal Dysphagia. Pt at high risk for aspiration given severity of dysphagia 2/2 advanced dementia. Recommend pt remain NPO--oral meds crushed " in pudding/applesauce. Pt may have SMALL tsp bites of puree consistencies FOR PLEASURE per pt/family request ONLY--SLP or RN must be present for any/all PO intake. Recommend palliative care consultation in order to educate pt's family re: illness trajectory and to clarify goals with family.    Goals:    SLP Goals        Problem: SLP Goal    Goal Priority Disciplines Outcome   SLP Goal     SLP Ongoing (interventions implemented as appropriate)   Description:  Short Term Goals:  1. Pt will participate in BSS to determine least restrictive diet.-ongoing  2. Pt will tolerate in pleasure feedings of puree textures with RN or SLP with no overt s/s of aspiration.-ongoing                         Plan:     · Patient to be seen:  3 x/week   · Plan of Care expires:  04/02/18  · Plan of Care reviewed with:  patient, other (see comments) (RN)   · SLP Follow-Up:  Yes       Discharge recommendations:  home health PT, home health OT, home health speech therapy     Time Tracking:     SLP Treatment Date:   03/04/18  Speech Start Time:  1325  Speech Stop Time:  1355     Speech Total Time (min):  30 min    Billable Minutes: Treatment Swallowing Dysfunction 30 minutes    Amada Gipson CCC-SLP  03/04/2018

## 2018-03-04 NOTE — PLAN OF CARE
Problem: Physical Therapy Goal  Goal: Physical Therapy Goal  Outcome: Ongoing (interventions implemented as appropriate)  Goals to be met by: 4/3/18     Patient will increase functional independence with mobility by performin) Sup <>sit wit Min A   2) Sit <>Stand with Supervision  3) Sit EOB for at least 10 minutes with supervision.   4) Ambulate 50 feet with HHA X1.   5)Pt's family to properly and safely demonstrate transfers and ambulation.     Comments: Initial evaluation complete PT to follow. Recommending BSC and Hospital bed

## 2018-03-04 NOTE — PLAN OF CARE
Problem: SLP Goal  Goal: SLP Goal  Short Term Goals:  1. Pt will participate in BSS to determine least restrictive diet.-ongoing  2. Pt will tolerate in pleasure feedings of puree textures with RN or SLP with no overt s/s of aspiration.-ongoing       Outcome: Ongoing (interventions implemented as appropriate)  3/4/18: Pt seen for ongoing BSS this PM. Pt with weak cough s/p swallow of thin liquids and wet/gurgly vocal quality s/p swallow of nectar thick liquids. Pt appeared to tolerate puree textures with no overt s/s of aspiration. However, SLP cannot objectively r/o aspiration at bedside. SLP recs: Puree for pleasure only with RN or SLP, NO LIQUIDS!, essential po meds ONLY buried in puree. SLP will f/u next date.  PAVAN Blandon. CCC-SLP  Speech-Language Pathologist

## 2018-03-04 NOTE — PT/OT/SLP EVAL
Occupational Therapy   Evaluation    Name: Verna Carranza  MRN: [id number]  Admitting Diagnosis:  Septic shock      Recommendations:     Discharge Recommendations: home health OT, home health PT  Discharge Equipment Recommendations:  bedside commode, hospital bed  Barriers to discharge:       History:     Occupational Profile:  Living Environment: lives w/ son on 1st level of home; T/S combo  Previous level of function: Req'ed A w/ all fxnl tasks; HHA-SBA for short distances w/in home  Roles and Routines: .  Equipment Owned:  bath bench  Assistance upon Discharge: 24hr S/A    Past Medical History:   Diagnosis Date    Alzheimer disease     Hyperlipidemia     Hypertension        History reviewed. No pertinent surgical history.    Subjective     Chief Complaint: none stated  Patient/Family stated goals: increase safety  Communicated with: nsg prior to session.  Pain/Comfort:  · Pain Rating 1: 0/10  · Pain Rating Post-Intervention 1: 0/10    Patients cultural, spiritual, Worship conflicts given the current situation:      Objective:     Patient found with:  (ICU monitoring)    General Precautions: Standard, fall, aspiration   Orthopedic Precautions:    Braces:       Occupational Performance:    Bed Mobility:    · Patient completed Rolling/Turning to Left with  maximal assistance  · Patient completed Supine to Sit with maximal assistance and total assistance  · Patient completed Sit to Supine with total assistance and 2 persons    Functional Mobility/Transfers:  · Patient completed Sit <> Stand Transfer with minimum assistance and of 2 persons  with  HHA   · Functional Mobility:     Activities of Daily Living:  · Grooming: total assistance face washing    Cognitive/Visual Perceptual:  Opened eyes in response to name  Occasional mumbling & indecipherable verbalizations in response to questions  Unable to follow simple commands for task performance    Physical Exam:  SENIA JOHNSON shldrs to ~90deg before pt resistance  B  elb-->Ds WFL   Static sitting balance: inconsistent Mod A for lean to L to CGA on pt's thighs to prevent post lean  Static stand w/ Min A x 2 (HHA)    Patient left L sidelying with all lines intact, call button in reach and nsg notified    [facility] 6 Click:  [facility] Total Score: 6    Treatment & Education:  OT melyssa completed w/ PT this date. Pt w/ mid stage Alzheimer's disease & per phone contact w/ son, req'ed A w/ all fxnl mobility & tasks. At PLOF, pt able to: verbally respond yes/no to simple questions re: needs/wants, raise limbs to A w/ dressing, maint static sitting balance at EOB & amb w/ in home via HHA/furniture cruising w/o DME.  Pt currently perf rolling to L w/ Max A; sup-->EOB w/ Max-total A; vicenta'd sitting EOB x 15min w/ mod A for occasional L lean & CGA through thighs to keep feet on floor & prevent post lean. Pt able to open eyes in response to name, but demo'ed incomprehensible mumbling occasionally in response to questions. Mostly unable to follow simple commands or acknowlege/use items correctly for G/H. Perf static standing x 2 attempts w/ Min HHA x 2 for ~30sec per. Req'ed total A x 2 for return to supine.    Education:    Assessment:   Pt presents w/ decreased overall endurance/conditioning, balance/mobility & coordination/cognition w/ subsequent decline in (I)/safety w/ BADLs, fxnl mobility & fxnl t/f's. OT 3x/wk to maximize phys/fxnl potential & edu/tx fly re: HEP, DME, positioning & safe/proper techs for A/cg'ing.    Verna Carranza is a 78 y.o. female with a medical diagnosis of Septic shock.  She presents with ..  Performance deficits affecting function are weakness, impaired endurance, impaired functional mobilty, impaired self care skills, impaired balance, impaired cognition, decreased safety awareness, edema.      Rehab Prognosis:  fair; patient would benefit from acute skilled OT services to address these deficits and reach maximum level of function.         Clinical Decision Makin.  " OT Mod:  "Pt evaluation falls under moderate complexity for evaluation coding due to identification of 3-5 performance deficits noted as stated above. Eval required Min/Mod assistance to complete on this date and detailed assessment(s) were utilized. Moreover, an expanded review of history and occupational profile obtained with additional review of cognitive, physical and psychosocial hx."     Plan:     Patient to be seen 3 x/week to address the above listed problems via self-care/home management, therapeutic activities, therapeutic exercises  · Plan of Care Expires: 04/04/18  · Plan of Care Reviewed with: patient, son    This Plan of care has been discussed with the patient who was involved in its development and understands and is in agreement with the identified goals and treatment plan    GOALS:    Occupational Therapy Goals        Problem: Occupational Therapy Goal    Goal Priority Disciplines Outcome Interventions   Occupational Therapy Goal     OT, PT/OT     Description:  Goals to be met by: 04/04/18     Patient will increase functional independence with ADLs by performing:    Sitting at edge of bed x15 minutes with Supervision.  Supine to sit with Minimal Assistance.  Toilet transfer to bedside commode with Minimal Assistance.                      Time Tracking:     OT Date of Treatment: 03/04/18  OT Start Time: 0851  OT Stop Time: 0933  OT Total Time (min): 42 min    Billable Minutes:Evaluation 12  Therapeutic Activity 30  Total Time 42    DIXIE Miller  3/4/2018    "

## 2018-03-05 LAB
ALBUMIN SERPL BCP-MCNC: 2.2 G/DL
ALP SERPL-CCNC: 68 U/L
ALT SERPL W/O P-5'-P-CCNC: 76 U/L
ANION GAP SERPL CALC-SCNC: 9 MMOL/L
ANISOCYTOSIS BLD QL SMEAR: SLIGHT
AST SERPL-CCNC: 38 U/L
BASOPHILS # BLD AUTO: 0 K/UL
BASOPHILS # BLD AUTO: 0.01 K/UL
BASOPHILS # BLD AUTO: 0.13 K/UL
BASOPHILS NFR BLD: 0 %
BASOPHILS NFR BLD: 0.1 %
BASOPHILS NFR BLD: 1 %
BILIRUB SERPL-MCNC: 1.4 MG/DL
BUN SERPL-MCNC: 5 MG/DL
CALCIUM SERPL-MCNC: 8.2 MG/DL
CHLORIDE SERPL-SCNC: 117 MMOL/L
CO2 SERPL-SCNC: 19 MMOL/L
CREAT SERPL-MCNC: 0.7 MG/DL
DIFFERENTIAL METHOD: ABNORMAL
EOSINOPHIL # BLD AUTO: 0.1 K/UL
EOSINOPHIL # BLD AUTO: 0.2 K/UL
EOSINOPHIL # BLD AUTO: 0.2 K/UL
EOSINOPHIL NFR BLD: 0.9 %
EOSINOPHIL NFR BLD: 1.3 %
EOSINOPHIL NFR BLD: 1.5 %
ERYTHROCYTE [DISTWIDTH] IN BLOOD BY AUTOMATED COUNT: 16 %
ERYTHROCYTE [DISTWIDTH] IN BLOOD BY AUTOMATED COUNT: 16.2 %
ERYTHROCYTE [DISTWIDTH] IN BLOOD BY AUTOMATED COUNT: 16.3 %
EST. GFR  (AFRICAN AMERICAN): >60 ML/MIN/1.73 M^2
EST. GFR  (NON AFRICAN AMERICAN): >60 ML/MIN/1.73 M^2
GIANT PLATELETS BLD QL SMEAR: PRESENT
GLUCOSE SERPL-MCNC: 89 MG/DL
HCT VFR BLD AUTO: 24.1 %
HCT VFR BLD AUTO: 24.8 %
HCT VFR BLD AUTO: 25.2 %
HGB BLD-MCNC: 8.3 G/DL
HGB BLD-MCNC: 8.8 G/DL
HGB BLD-MCNC: 8.8 G/DL
HYPOCHROMIA BLD QL SMEAR: ABNORMAL
LYMPHOCYTES # BLD AUTO: 0.8 K/UL
LYMPHOCYTES # BLD AUTO: 0.8 K/UL
LYMPHOCYTES # BLD AUTO: 1 K/UL
LYMPHOCYTES NFR BLD: 5.6 %
LYMPHOCYTES NFR BLD: 7 %
LYMPHOCYTES NFR BLD: 7.4 %
MCH RBC QN AUTO: 30.1 PG
MCH RBC QN AUTO: 30.6 PG
MCH RBC QN AUTO: 31.1 PG
MCHC RBC AUTO-ENTMCNC: 34.4 G/DL
MCHC RBC AUTO-ENTMCNC: 34.9 G/DL
MCHC RBC AUTO-ENTMCNC: 35.5 G/DL
MCV RBC AUTO: 87 FL
MCV RBC AUTO: 88 FL
MCV RBC AUTO: 88 FL
MONOCYTES # BLD AUTO: 0.7 K/UL
MONOCYTES # BLD AUTO: 0.8 K/UL
MONOCYTES # BLD AUTO: 0.9 K/UL
MONOCYTES NFR BLD: 6.2 %
MONOCYTES NFR BLD: 6.3 %
MONOCYTES NFR BLD: 6.4 %
NEUTROPHILS # BLD AUTO: 11 K/UL
NEUTROPHILS # BLD AUTO: 11.7 K/UL
NEUTROPHILS # BLD AUTO: 9.6 K/UL
NEUTROPHILS NFR BLD: 84.4 %
NEUTROPHILS NFR BLD: 84.5 %
NEUTROPHILS NFR BLD: 86.9 %
OVALOCYTES BLD QL SMEAR: ABNORMAL
PLATELET # BLD AUTO: 100 K/UL
PLATELET # BLD AUTO: 107 K/UL
PLATELET # BLD AUTO: 95 K/UL
PLATELET BLD QL SMEAR: ABNORMAL
PMV BLD AUTO: 10.8 FL
PMV BLD AUTO: 11.4 FL
PMV BLD AUTO: 11.8 FL
POCT GLUCOSE: 102 MG/DL (ref 70–110)
POCT GLUCOSE: 143 MG/DL (ref 70–110)
POCT GLUCOSE: 77 MG/DL (ref 70–110)
POCT GLUCOSE: 83 MG/DL (ref 70–110)
POIKILOCYTOSIS BLD QL SMEAR: SLIGHT
POLYCHROMASIA BLD QL SMEAR: ABNORMAL
POTASSIUM SERPL-SCNC: 4 MMOL/L
PROT SERPL-MCNC: 5.1 G/DL
RBC # BLD AUTO: 2.76 M/UL
RBC # BLD AUTO: 2.83 M/UL
RBC # BLD AUTO: 2.88 M/UL
SODIUM SERPL-SCNC: 145 MMOL/L
TARGETS BLD QL SMEAR: ABNORMAL
WBC # BLD AUTO: 11.32 K/UL
WBC # BLD AUTO: 13.48 K/UL
WBC # BLD AUTO: 13.63 K/UL

## 2018-03-05 PROCEDURE — 97110 THERAPEUTIC EXERCISES: CPT

## 2018-03-05 PROCEDURE — 11000001 HC ACUTE MED/SURG PRIVATE ROOM

## 2018-03-05 PROCEDURE — 97530 THERAPEUTIC ACTIVITIES: CPT

## 2018-03-05 PROCEDURE — 97535 SELF CARE MNGMENT TRAINING: CPT

## 2018-03-05 PROCEDURE — 25000003 PHARM REV CODE 250: Performed by: INTERNAL MEDICINE

## 2018-03-05 PROCEDURE — 97116 GAIT TRAINING THERAPY: CPT

## 2018-03-05 PROCEDURE — C9113 INJ PANTOPRAZOLE SODIUM, VIA: HCPCS | Performed by: STUDENT IN AN ORGANIZED HEALTH CARE EDUCATION/TRAINING PROGRAM

## 2018-03-05 PROCEDURE — 36415 COLL VENOUS BLD VENIPUNCTURE: CPT

## 2018-03-05 PROCEDURE — 63600175 PHARM REV CODE 636 W HCPCS: Performed by: STUDENT IN AN ORGANIZED HEALTH CARE EDUCATION/TRAINING PROGRAM

## 2018-03-05 PROCEDURE — 94761 N-INVAS EAR/PLS OXIMETRY MLT: CPT

## 2018-03-05 PROCEDURE — 85025 COMPLETE CBC W/AUTO DIFF WBC: CPT

## 2018-03-05 PROCEDURE — 80053 COMPREHEN METABOLIC PANEL: CPT

## 2018-03-05 RX ADMIN — LEVETIRACETAM 1500 MG: 15 INJECTION INTRAVENOUS at 09:03

## 2018-03-05 RX ADMIN — PANTOPRAZOLE SODIUM 40 MG: 40 INJECTION, POWDER, FOR SOLUTION INTRAVENOUS at 09:03

## 2018-03-05 RX ADMIN — DEXTROSE, SODIUM CHLORIDE, SODIUM LACTATE, POTASSIUM CHLORIDE, AND CALCIUM CHLORIDE: 5; .6; .31; .03; .02 INJECTION, SOLUTION INTRAVENOUS at 02:03

## 2018-03-05 RX ADMIN — MOXIFLOXACIN HYDROCHLORIDE 400 MG: 400 TABLET, FILM COATED ORAL at 09:03

## 2018-03-05 NOTE — PLAN OF CARE
Team had family meeting and Dr. Cantu has contacted Arcelia Traore at [facility] to come speak with family regarding hospice. Arcelia will be to hospital in about 20 minutes; family notified.  TN to follow.    1345- Arcelia notified TN family wanting home hospice and admit paperwork completed. Family request pt d/c tomorrow as they need to rearrange furniture and clean house . TN notified team .  Tn notified floor nurse SERINA Langford and ERIKA Melchor of discharge plan for tomorrow.         03/05/18 0016   Discharge Reassessment   Assessment Type Discharge Planning Reassessment   Provided patient/caregiver education on the expected discharge date and the discharge plan Yes   Do you have any problems affording any of your prescribed medications? No   Discharge Plan A Inpatient Hospice   Discharge Plan B Hospice/home   Patient choice form signed by patient/caregiver N/A   Can the patient answer the patient profile reliably? No, cognitively impaired   How does the patient rate their overall health at the present time? Poor   Describe the patient's ability to walk at the present time. Does not walk or unable to take any steps at all   How often would a person be available to care for the patient? Often   Number of comorbid conditions (as recorded on the chart) Five or more   During the past month, has the patient often been bothered by feeling down, depressed or hopeless? No   During the past month, has the patient often been bothered by little interest or pleasure in doing things? No

## 2018-03-05 NOTE — PROGRESS NOTES
Notified team r/t patient temp. 93.9-94 and of patient DNR papers checked for all resuscitations to be initiated, instead of no resuscitations.

## 2018-03-05 NOTE — NURSING TRANSFER
Nursing Transfer Note      3/4/2018     Transfer To: [id number]    Transfer via bed    Transfer with cardiac monitoring    Transported by SERINA Granda and Taylor RN    Medicines sent: yes    Chart send with patient: Yes    Notified: granddaughter    Patient reassessed at: 3/4/2018 2120    Upon arrival to floor: cardiac monitor applied, patient oriented to room, call bell in reach and bed in lowest position.    Report given by SERINA Negro to 5th floor Rn. Pt transported. Vss.

## 2018-03-05 NOTE — PLAN OF CARE
Problem: Patient Care Overview  Goal: Plan of Care Review  Outcome: Ongoing (interventions implemented as appropriate)  POC reviewed with the pt and her granddaughter. VSS throughout shift, no acute events. Pt had 3 BMs. UO 15-45/hr. Pureed diet maintained. Refer to flowsheets for vitals, gtts, and assessments. Will continue to monitor.

## 2018-03-05 NOTE — PLAN OF CARE
Problem: Patient Care Overview  Goal: Plan of Care Review  Outcome: Ongoing (interventions implemented as appropriate)  Patient remains free from falls,bed alarm in use. Patient turned in bed using wedge and pillows. IV fluids in progress and IV kappra given for seizure prevention. Blood glucose 83 for AM.

## 2018-03-05 NOTE — PROGRESS NOTES
U Internal Medicine Resident HO-III Progress Note    Subjective:      No acute events or episodes of hemodynamic instability overnight.  Alert and awake this AM.  Non-participatory with interview     Objective:   Last 24 Hour Vital Signs:  BP  Min: 98/57  Max: 169/83  Temp  Av.5 °F (35.8 °C)  Min: 94.6 °F (34.8 °C)  Max: 98.4 °F (36.9 °C)  Pulse  Av.7  Min: 53  Max: 84  Resp  Av.9  Min: 16  Max: 27  SpO2  Av.5 %  Min: 99 %  Max: 100 %  Height  Av' (152.4 cm)  Min: 5' (152.4 cm)  Max: 5' (152.4 cm)  Weight  Av kg (99 lb 3.3 oz)  Min: 45 kg (99 lb 3.3 oz)  Max: 45 kg (99 lb 3.3 oz)  I/O last 3 completed shifts:  In: 8.3 [I.V.:1808.3; IV Piggyback:300]  Out:  [Urine:2120]    Physical Examination:   General:          Alert and awake.  Non-verbal.  No eye contact  HENT:             NCAT; anicteric sclera; OP clear with MMM  Cardio:             Regular and lee with normal S1 and S2; no murmurs  Resp:               CTAB; respirations unlabored; occasional rhonchi  Abdom:            Soft, NTND with normoactive bowel sounds  Extrem:            WWP with no edema  Pulses:            2+ and symmetric distally  Neuro:             Awake.  MAEW.  PERRL.  EMOi.  Non-verbal.  repetative right hand movement    Laboratory:  Laboratory Data Reviewed: yes  Pertinent Findings:  Lab Results   Component Value Date    WBC 13.48 (H) 2018    HGB 8.3 (L) 2018    HCT 24.1 (L) 2018    MCV 87 2018    PLT 95 (L) 2018     Lab Results   Component Value Date    CREATININE 0.7 2018    BUN 5 (L) 2018     2018    K 4.0 2018     (H) 2018    CO2 19 (L) 2018     Lab Results   Component Value Date    ALT 76 (H) 2018    AST 38 2018    ALKPHOS 68 2018    BILITOT 1.4 (H) 2018         Microbiology Data Reviewed: yes  Pertinent Findings:   UCx -- NGTD   BCx -- NGTD   RCx -- Norm Jessie    Other Results:  EKG (my  interpretation):   - NO NEW ECG    Radiology Data Reviewed: yes  Pertinent Findings:  - NO NEW IMAGING    Current Medications:     Infusions:   dextrose 5% lactated ringers 100 mL/hr at 03/05/18 0216        Scheduled:   dextrose 50%  25 g Intravenous Once    levetiracetam IVPB  1,500 mg Intravenous Q12H    moxifloxacin  400 mg Oral Daily    pantoprazole  40 mg Intravenous BID        PRN:  sodium chloride, sodium chloride, dextrose 50%, dextrose 50%, glucagon (human recombinant), glucose, glucose, hydrALAZINE, lorazepam      Assessment:     Verna Carranza is a 78 y.o.female with  Patient Active Problem List    Diagnosis Date Noted    Hypokalemia 03/04/2018    Hypomagnesemia 03/04/2018    Acidosis 03/01/2018    Shock 03/01/2018    Seizure 02/28/2018    Septic shock 02/27/2018    Acute respiratory failure with hypoxia and hypercapnia 02/27/2018    Acute encephalopathy 02/27/2018    Pneumonia of right lower lobe due to infectious organism 02/27/2018    AMY (acute kidney injury) 02/27/2018    CKD (chronic kidney disease), stage III 02/27/2018    Transaminitis 02/27/2018    Elevated troponin 02/27/2018    Advanced dementia 02/27/2018    Bradycardia     Altered mental state 02/26/2018        Plan:   Neuro:  - Alert and awake off sedation.  moves around, occasionally verbal, not following commands  - Neuro consulted.  CT head with stable cerebral atrophy; MRI with signs of normal pressure hydrocephalus and cerebral atrophy  - EEG without focal seizure, diffuse slowing. continue keppra to 1500 BID  - appears to be at baseline per family.  pending family discussion     CVS:  - Hemodynamically stable off pressors. ECHO with DD and PHTN     Resp:  - concern for aspiration event initially.  Now extubated to NC, sating well on room air currently     FEN/GI:  - Persistent hypoglycemic episodes.  AM cortisol WNL.  Glucoses now improved  - Continue aggressive electrolyte repletion as needed     Heme/ID:  -  Remains afebrile.  UCx, BCx, RCx all NGTD  - Continue empiric Tamiflu, Vanc and Zosyn; deescalate to augementin today vs moxifloxacin  - S/p 2U pRBCs, 1U platelets after bleeding from CVC site.    - Hgb stable.  Hemolysis labs neg     Renal:  - Cr remains stable.  Avoiding nephrotoxins     Endo:  - episodes of persistent hypoglycemia improved.  Continue D5LR     CODE:  DNR  VTE PPx:  None 2/2 bleeding and thrombocytopenia      Disposition: pending family decision regarding ongoing care      Lj Hawkins  [facility] Internal Medicine HO-III  U IM Service Team A    [facility] Medicine Hospitalist Pager numbers:   [facility] Hospitalist Medicine Team A (Pepe/Nandini): [phone]  [facility] Hospitalist Medicine Team B (Maribell/Rhonda):  [phone]

## 2018-03-05 NOTE — PLAN OF CARE
Problem: Physical Therapy Goal  Goal: Physical Therapy Goal  Outcome: Ongoing (interventions implemented as appropriate)  Patient responding to basic commands with manual cues 10% of the time; sup to sit with totalA; sat at EOB x 5 minutes with SBA, modA x 2 for transfers and modA x 2 to amb without AD - posterior lean and toward R; will issue family gait/txf belt for home use; recommend BSC and wheelchair for longer distance mobility as needed and OOB sitting; family not amenable to hospital bed at this time; home health PT/OT if able.

## 2018-03-06 VITALS
TEMPERATURE: 98 F | HEIGHT: 60 IN | RESPIRATION RATE: 17 BRPM | WEIGHT: 106.25 LBS | OXYGEN SATURATION: 99 % | BODY MASS INDEX: 20.86 KG/M2 | DIASTOLIC BLOOD PRESSURE: 99 MMHG | HEART RATE: 73 BPM | SYSTOLIC BLOOD PRESSURE: 164 MMHG

## 2018-03-06 LAB
ALBUMIN SERPL BCP-MCNC: 2.2 G/DL
ALP SERPL-CCNC: 67 U/L
ALT SERPL W/O P-5'-P-CCNC: 68 U/L
ANION GAP SERPL CALC-SCNC: 11 MMOL/L
AST SERPL-CCNC: 39 U/L
BASOPHILS # BLD AUTO: 0 K/UL
BASOPHILS # BLD AUTO: 0.01 K/UL
BASOPHILS NFR BLD: 0 %
BASOPHILS NFR BLD: 0.1 %
BILIRUB SERPL-MCNC: 1.5 MG/DL
BUN SERPL-MCNC: 7 MG/DL
CALCIUM SERPL-MCNC: 8.2 MG/DL
CHLORIDE SERPL-SCNC: 117 MMOL/L
CO2 SERPL-SCNC: 19 MMOL/L
CREAT SERPL-MCNC: 0.6 MG/DL
DIFFERENTIAL METHOD: ABNORMAL
DIFFERENTIAL METHOD: ABNORMAL
EOSINOPHIL # BLD AUTO: 0.2 K/UL
EOSINOPHIL # BLD AUTO: 0.2 K/UL
EOSINOPHIL NFR BLD: 1.7 %
EOSINOPHIL NFR BLD: 2.2 %
ERYTHROCYTE [DISTWIDTH] IN BLOOD BY AUTOMATED COUNT: 15.6 %
ERYTHROCYTE [DISTWIDTH] IN BLOOD BY AUTOMATED COUNT: 15.8 %
EST. GFR  (AFRICAN AMERICAN): >60 ML/MIN/1.73 M^2
EST. GFR  (NON AFRICAN AMERICAN): >60 ML/MIN/1.73 M^2
GLUCOSE SERPL-MCNC: 60 MG/DL
HCT VFR BLD AUTO: 25.1 %
HCT VFR BLD AUTO: 26.8 %
HGB BLD-MCNC: 8.7 G/DL
HGB BLD-MCNC: 9.1 G/DL
LYMPHOCYTES # BLD AUTO: 1.1 K/UL
LYMPHOCYTES # BLD AUTO: 1.4 K/UL
LYMPHOCYTES NFR BLD: 13.4 %
LYMPHOCYTES NFR BLD: 14.8 %
MCH RBC QN AUTO: 30.2 PG
MCH RBC QN AUTO: 30.5 PG
MCHC RBC AUTO-ENTMCNC: 34 G/DL
MCHC RBC AUTO-ENTMCNC: 34.7 G/DL
MCV RBC AUTO: 87 FL
MCV RBC AUTO: 90 FL
MONOCYTES # BLD AUTO: 0.6 K/UL
MONOCYTES # BLD AUTO: 1 K/UL
MONOCYTES NFR BLD: 10.9 %
MONOCYTES NFR BLD: 7.5 %
NEUTROPHILS # BLD AUTO: 6 K/UL
NEUTROPHILS # BLD AUTO: 6.7 K/UL
NEUTROPHILS NFR BLD: 72.6 %
NEUTROPHILS NFR BLD: 76.4 %
PLATELET # BLD AUTO: 114 K/UL
PLATELET # BLD AUTO: 163 K/UL
PMV BLD AUTO: 11.9 FL
PMV BLD AUTO: 12.2 FL
POCT GLUCOSE: 57 MG/DL (ref 70–110)
POCT GLUCOSE: 70 MG/DL (ref 70–110)
POCT GLUCOSE: 76 MG/DL (ref 70–110)
POTASSIUM SERPL-SCNC: 4.2 MMOL/L
PROT SERPL-MCNC: 5.3 G/DL
RBC # BLD AUTO: 2.88 M/UL
RBC # BLD AUTO: 2.98 M/UL
SODIUM SERPL-SCNC: 147 MMOL/L
WBC # BLD AUTO: 7.85 K/UL
WBC # BLD AUTO: 9.19 K/UL

## 2018-03-06 PROCEDURE — 97110 THERAPEUTIC EXERCISES: CPT

## 2018-03-06 PROCEDURE — 25000003 PHARM REV CODE 250: Performed by: INTERNAL MEDICINE

## 2018-03-06 PROCEDURE — 94761 N-INVAS EAR/PLS OXIMETRY MLT: CPT

## 2018-03-06 PROCEDURE — 80053 COMPREHEN METABOLIC PANEL: CPT

## 2018-03-06 PROCEDURE — 97116 GAIT TRAINING THERAPY: CPT

## 2018-03-06 PROCEDURE — 36415 COLL VENOUS BLD VENIPUNCTURE: CPT

## 2018-03-06 PROCEDURE — 97530 THERAPEUTIC ACTIVITIES: CPT

## 2018-03-06 PROCEDURE — C9113 INJ PANTOPRAZOLE SODIUM, VIA: HCPCS | Performed by: STUDENT IN AN ORGANIZED HEALTH CARE EDUCATION/TRAINING PROGRAM

## 2018-03-06 PROCEDURE — 63600175 PHARM REV CODE 636 W HCPCS: Performed by: STUDENT IN AN ORGANIZED HEALTH CARE EDUCATION/TRAINING PROGRAM

## 2018-03-06 PROCEDURE — 85025 COMPLETE CBC W/AUTO DIFF WBC: CPT | Mod: 91

## 2018-03-06 RX ORDER — LEVETIRACETAM 750 MG/1
1500 TABLET ORAL 2 TIMES DAILY
Qty: 120 TABLET | Refills: 11 | Status: SHIPPED | OUTPATIENT
Start: 2018-03-06 | End: 2018-03-06

## 2018-03-06 RX ORDER — LEVETIRACETAM 750 MG/1
1500 TABLET ORAL 2 TIMES DAILY
Qty: 120 TABLET | Refills: 11 | Status: SHIPPED | OUTPATIENT
Start: 2018-03-06 | End: 2019-03-06

## 2018-03-06 RX ORDER — MOXIFLOXACIN HYDROCHLORIDE 400 MG/1
400 TABLET ORAL DAILY
Qty: 11 TABLET | Refills: 0 | Status: SHIPPED | OUTPATIENT
Start: 2018-03-07

## 2018-03-06 RX ADMIN — MOXIFLOXACIN HYDROCHLORIDE 400 MG: 400 TABLET, FILM COATED ORAL at 08:03

## 2018-03-06 RX ADMIN — PANTOPRAZOLE SODIUM 40 MG: 40 INJECTION, POWDER, FOR SOLUTION INTRAVENOUS at 08:03

## 2018-03-06 RX ADMIN — LEVETIRACETAM 1500 MG: 15 INJECTION INTRAVENOUS at 08:03

## 2018-03-06 NOTE — PT/OT/SLP PROGRESS
Occupational Therapy   Treatment    Name: Verna Carranza  MRN: [id number]  Admitting Diagnosis:  Septic shock       Recommendations:     Discharge Recommendations: home health OT, home health PT, home with home health  Discharge Equipment Recommendations:  bedside commode, wheelchair  Barriers to discharge:  None    Subjective     Communicated with: nurse prior to session.  Pain/Comfort:  · Pain Rating 1: 0/10  · Pain Rating Post-Intervention 1: 0/10    Patients cultural, spiritual, Mosque conflicts given the current situation: na    Objective:     Patient found with: bed alarm, peripheral IV, telemetry    General Precautions: Standard, aspiration, fall   Orthopedic Precautions:N/A   Braces: N/A     Occupational Performance:    Bed Mobility:    · Patient completed Scooting/Bridging with total assistance and 2 persons  · Patient completed Supine to Sit with total assistance and 2 persons  · Patient completed Sit to Supine with total assistance and 2 persons     Functional Mobility/Transfers:  · Patient completed Sit <> Stand Transfer with moderate assistance and of 2 persons  with  no assistive device   · Patient completed Bed <> Chair Transfer using Step Transfer technique with moderate assistance and of 2 persons with no assistive device  · Functional Mobility: mod assist x 2 person assist; granddaughter cueing pt to take steps ; pt following commands inconsistently and delayed responses. Blank stare noted;  Tiana crosses feet kameron with turns; little balance recovery reactions.     Activities of Daily Living:  · Feeding:  dependence seated uprigh in BS chair with legs elevated 2/2 tendedny to  rock forward in chair; fall risk and poor safety awareness.    Patient left HOB elevated with all lines intact, call button in reach, bed alarm on, nurse notified and sister present    [facility] 6 Click:  [facility] Total Score: 6    Treatment & Education:  ANGLE CANCHOLA, tone normalization 2/2 pt  With mod hypertonicity in biceps and  rests in bed with elbows in flexed position; mod resistance with attempt to passively extend arms; slow gentle shaking of UE normalize tone to allow full ext on R but min flexion contracture in L elbow; pillow wrap splints placed as family instructed in application.  verbalized understanding to wear at night only and purpose of use.  Family instructed to place medium sized stuffed animals between axilla and elbow in flexed part to maintain as much passive shld and elbow extension during day;  Pt noted to spontaneously use LUE and moved extremities minimally against gravity; HHA with mod asist x 2 people to walk ion room to sink, door and BS chair on opposite side of the room with 1 sitting rest break. Max vc for directional turns and safety awareness.   Education:    Assessment:     Verna Carranza is a 78 y.o. female with a medical diagnosis of Septic shock.  She presents with Slow progress. Initiated family training in stand step t/f bed<>chair,   UE positioning for contracture management/reduction, sitting up right for safe feeding and swallow tech in BS chair.  Continue with OT POC.    .  Performance deficits affecting function are weakness, impaired endurance, impaired self care skills, impaired functional mobilty, gait instability, impaired cognition, decreased coordination, decreased upper extremity function, decreased lower extremity function, decreased safety awareness, abnormal tone, decreased ROM, impaired coordination, impaired joint extensibility, impaired muscle length.      Rehab Prognosis:  fair; patient would benefit from acute skilled OT services to address these deficits and reach maximum level of function.       Plan:     Patient to be seen 3 x/week to address the above listed problems via self-care/home management, therapeutic activities, neuromuscular re-education, therapeutic exercises  · Plan of Care Expires: 04/04/18  · Plan of Care Reviewed with: patient, caregiver, grandchild(jean-paul)    This  Plan of care has been discussed with the patient who was involved in its development and understands and is in agreement with the identified goals and treatment plan    GOALS:    Occupational Therapy Goals        Problem: Occupational Therapy Goal    Goal Priority Disciplines Outcome Interventions   Occupational Therapy Goal     OT, PT/OT Ongoing (interventions implemented as appropriate)    Description:  Goals to be met by: 04/04/18     Patient will increase functional independence with ADLs by performing:    Sitting at edge of bed x15 minutes with Supervision.  Supine to sit with Minimal Assistance.  Toilet transfer to bedside commode with Minimal Assistance.                      Time Tracking:     OT Date of Treatment: 03/05/18  OT Start Time: 1320  OT Stop Time: 1415  OT Total Time (min): 55 min    Billable Minutes:Self Care/Home Management 10  Therapeutic Exercise 15  Total Time 55 COTX with PT    Rakel June OT  3/5/2018

## 2018-03-06 NOTE — PROGRESS NOTES
Call received from Kady with N with Ambulance auth for Jose( [phone])  Auth Number [id number].     Family would like to transport patient themselves.      Nurse to call Jose if family unable to transport patient.

## 2018-03-06 NOTE — PROGRESS NOTES
"U Internal Medicine Resident HO-I Progress Note    Subjective:      Awake and alert this morning. Answers "no" when asked if in pain. No acute events overnight.     Objective:   Last 24 Hour Vital Signs:  BP  Min: 108/58  Max: 165/88  Temp  Av.4 °F (36.3 °C)  Min: 93.6 °F (34.2 °C)  Max: 101 °F (38.3 °C)  Pulse  Av.3  Min: 48  Max: 97  Resp  Av.8  Min: 14  Max: 22  SpO2  Av.3 %  Min: 96 %  Max: 100 %  Weight  Av.2 kg (106 lb 4.2 oz)  Min: 48.2 kg (106 lb 4.2 oz)  Max: 48.2 kg (106 lb 4.2 oz)  I/O last 3 completed shifts:  In: 1200 [I.V.:1000; IV Piggyback:200]  Out: 1500 [Urine:1500]    Physical Examination:  General: Alert and awake.  Non-verbal.  No eye contact  HENT: NCAT; anicteric sclera; OP clear with MMM  Cardio: RRR, S1 and S2; no murmurs  Resp: CTAB  Abdom: Soft, NTND with normoactive bowel sounds  Extrem: WWP with no edema  Pulses: 2+ and symmetric distally  Neuro: Awake. Non-verbal.  Repetative right hand movement    Laboratory:  Laboratory Data Reviewed: yes  Pertinent Findings:  Lab Results   Component Value Date    WBC 7.85 2018    HGB 8.7 (L) 2018    HCT 25.1 (L) 2018    MCV 87 2018     (L) 2018     Lab Results   Component Value Date    CREATININE 0.7 2018    BUN 5 (L) 2018     2018    K 4.0 2018     (H) 2018    CO2 19 (L) 2018     Lab Results   Component Value Date    ALT 76 (H) 2018    AST 38 2018    ALKPHOS 68 2018    BILITOT 1.4 (H) 2018     Microbiology Data Reviewed: yes  Pertinent Findings:   UCx -- NGTD   BCx -- NGTD   RCx -- Norm Jessie    Other Results:  EKG (my interpretation): No new.    Radiology Data Reviewed: yes  Pertinent Findings:  No new.    Current Medications:     Infusions:       Scheduled:   dextrose 50%  25 g Intravenous Once    levetiracetam IVPB  1,500 mg Intravenous Q12H    moxifloxacin  400 mg Oral Daily    pantoprazole  40 " mg Intravenous BID        PRN:  sodium chloride, sodium chloride, dextrose 50%, dextrose 50%, glucagon (human recombinant), glucose, glucose, hydrALAZINE, lorazepam      Assessment:     Verna Carranza is a 78 y.o.female with  Patient Active Problem List    Diagnosis Date Noted    Hypokalemia 03/04/2018    Hypomagnesemia 03/04/2018    Acidosis 03/01/2018    Shock 03/01/2018    Seizure 02/28/2018    Septic shock 02/27/2018    Acute respiratory failure with hypoxia and hypercapnia 02/27/2018    Acute encephalopathy 02/27/2018    Pneumonia of right lower lobe due to infectious organism 02/27/2018    AMY (acute kidney injury) 02/27/2018    CKD (chronic kidney disease), stage III 02/27/2018    Transaminitis 02/27/2018    Elevated troponin 02/27/2018    Advanced dementia 02/27/2018    Bradycardia     Altered mental state 02/26/2018        Plan:     Neuro:  - Alert and awake off sedation.  moves around, occasionally verbal, not following commands  - Neuro consulted.  CT head with stable cerebral atrophy; MRI with signs of normal pressure hydrocephalus and cerebral atrophy  - EEG without focal seizure, diffuse slowing. continue keppra to 1500 BID  - Appears to be at baseline per family.  Family discussion regarding GOC -- decided on home hospice.     CVS:  - Hemodynamically stable off pressors. ECHO with DD and PHTN     Resp:  - concern for aspiration event initially.  Now extubated to NC, sating well on room air currently     FEN/GI:  - Persistent hypoglycemic episodes.  AM cortisol WNL.  Glucoses now improved  - Continue aggressive electrolyte repletion as needed     Heme/ID:  - UCx, BCx, RCx all NGTD.  Tm 101F this morning.  Patient's family has decided on home hospice.  - Continue empiric Tamiflu, Vanc and Zosyn; deescalate to augementin today vs moxifloxacin  - S/p 2U pRBCs, 1U platelets after bleeding from CVC site.    - Hgb stable.  Hemolysis labs neg     Renal:  - Cr remains stable.  Avoiding  nephrotoxins     Endo:  - No acute issues     CODE:  DNR/DNI  VTE PPx:  None 2/2 bleeding and thrombocytopenia   Disposition: likely discharge with home hospice today    Naz Frederick  U Internal Medicine HO-I  U IM Service Team A    [facility] Medicine Hospitalist Pager numbers:   [facility] Hospitalist Medicine Team A (Pepe/Nandini): [phone]  [facility] Hospitalist Medicine Team B (Maribell/Rhonda):  [phone]

## 2018-03-06 NOTE — PROCEDURES - EMERGENCY DEPT.
[facility]  Adult Nutrition  Consult Note    SUMMARY     Recommendations    Recommendation/Intervention:   1. Diet texture per SLP   2. Assist with feeds   3. Consult if family/team desire nutrition support   4. RD to monitor    Goals: Pleasure feeds  Nutrition Goal Status: new  Communication of RD Recs: reviewed with RN    Continuum of Care Plan    Referral to Outpatient Services:  (D/C planning: pleasure feeds; hospice)    Reason for Assessment    Reason for Assessment: identified at risk by screening criteria (dysphagia)  Diagnosis:  (Septic shock)  Relevent Medical History: HTN, HLD, alzheimers         General Information Comments: SLP recs pureed for pleasure feeds only, no liquids. Patient appears nourished. Noted dry lips. Plan for hospice upon d/c. NSg notes indicate between 25-75% intake of pureed diet. No mention of PEG or nutrition support at this time.      Nutrition Prescription Ordered    Current Diet Order: pureed  Nutrition Order Comments: no liquids        Evaluation of Received Nutrients/Fluid Intake     Energy Calories Required: not meeting needs   Protein Required: not meeting needs      I/O: 100/500         Fluid Required: not meeting needs  Comments: LBM: 3/5     % Intake of Estimated Energy Needs: 50%  % Meal Intake: 50% on average    Nutrition Risk Screen     Nutrition Risk Screen: dysphagia or difficulty swallowing    Nutrition/Diet History     Food Preferences: OSVALDO   Factors Affecting Nutritional Intake: impaired cognitive status/motor control, difficulty/impaired swallowing    Labs/Tests/Procedures/Meds     Pertinent Labs Reviewed: reviewed   Pertinent Medications Reviewed: reviewed    Physical Findings    Overall Physical Appearance: nourished   Oral/Mouth Cavity: tooth/teeth missing  Skin: intact    Anthropometrics    Temp: 97.7 °F (36.5 °C)     Height: 5' (152.4 cm)  Weight Method: Bed Scale  Weight: 48.2 kg (106 lb 4.2 oz)     Ideal Body Weight (IBW), Female: 100  lb     % Ideal Body Weight, Female (lb): 106.26 lb  BMI (Calculated): 20.8  BMI Grade: 18.5-24.9 - normal     Estimated/Assessed Needs    Weight Used For Calorie Calculations: 48.2 kg (106 lb 4.2 oz)      Energy Calorie Requirements (kcal): 4539-2062 kcal  Energy Need Method: Kcal/kg     RMR ([place]-St. Jeor Equation): 883.5      Weight Used For Protein Calculations: 48.2 kg (106 lb 4.2 oz)  Protein Requirements: 55 g     Fluid Need Method: RDA Method      RDA Method (mL): 1200      Assessment and Plan    Nutrition Dx: Inadequate Energy Needs r/t dysphagia as evidenced by pleasure feeds with pureed diet, no liquids  Nutrition Dx Status: N ew      Monitor and Evaluation    Food and Nutrient Intake: energy intake, food and beverage intake  Food and Nutrient Adminstration: diet order, enteral and parenteral nutrition administration  Knowledge/Beliefs/Attitudes: food and nutrition knowledge/skill  Physical Activity and Function: nutrition-related ADLs and IADLs  Anthropometric Measurements: weight, weight change  Biochemical Data, Medical Tests and Procedures: electrolyte and renal panel, glucose/endocrine profile  Nutrition-Focused Physical Findings: overall appearance    Nutrition Risk    Level of Risk:  (2 x week)    Nutrition Follow-Up    RD Follow-up?: Yes

## 2018-03-06 NOTE — PT/OT/SLP PROGRESS
Physical Therapy Treatment    Patient Name:  Verna Carranza   MRN:  [id number]    Recommendations:     Discharge Recommendations:  home health PT, home health OT if able  Discharge Equipment Recommendations: bedside commode, wheelchair   Barriers to discharge: None    Assessment:     Verna Carranza is a 78 y.o. female admitted with a medical diagnosis of Septic shock.  She presents with the following impairments/functional limitations:  weakness, impaired endurance, impaired self care skills, impaired functional mobilty, gait instability, impaired balance, decreased safety awareness, impaired cognition, edema, decreased lower extremity function, decreased upper extremity function Patient responding to basic commands with manual cues 10% of the time; sup to sit with totalA; sat at EOB x 5 minutes with SBA, modA x 2 for transfers and modA x 2 to amb without AD - posterior lean and toward R; will issue family gait/txf belt for home use; recommend BSC and wheelchair for longer distance mobility as needed and OOB sitting; granddtr not amenable to hospital bed at this time; home health PT/OT if able..    Rehab Prognosis:  good; patient would benefit from acute skilled PT services to address these deficits and reach maximum level of function.      Recent Surgery: * No surgery found *      Plan:     During this hospitalization, patient to be seen 3 x/week to address the above listed problems via gait training, therapeutic activities, therapeutic exercises  · Plan of Care Expires:  04/03/18   Plan of Care Reviewed with: patient    Subjective     Communicated with nurse prior to session.  Patient found supine upon PT entry to room, agreeable to treatment.      Chief Complaint: Patient non-verbal-did not appear to be in any discomfort  Pain/Comfort:  · Pain Rating 1: 0/10  · Pain Rating Post-Intervention 1: 0/10    Patients cultural, spiritual, Druze conflicts given the current situation: n/a    Objective:     Patient found  with: telemetry     General Precautions: Standard, aspiration, fall, NPO   Orthopedic Precautions:N/A   Braces: N/A     Functional Mobility:  · Bed Mobility:     · Rolling Left:  total assistance  · Supine to Sit: total assistance  · Transfers:     · Sit to Stand:  moderate assistance and of 2 persons with no AD  · Bed to Chair: moderate assistance and of 2 persons with  no AD  using  Step Transfer  · Gait: Patient amb with modA x 2 without AD 2x 15ft; pt with posterior and R lean, small SUHAS, short step length, increased VCs/MCs with backstepping and side stepping, decreased foot clearance, mild forward trunk lean  · Balance: sit static balance fair+, stand balance poor       AM-PAC 6 CLICK MOBILITY  Turning over in bed (including adjusting bedclothes, sheets and blankets)?: 2  Sitting down on and standing up from a chair with arms (e.g., wheelchair, bedside commode, etc.): 3  Moving from lying on back to sitting on the side of the bed?: 2  Moving to and from a bed to a chair (including a wheelchair)?: 2  Need to walk in hospital room?: 2  Climbing 3-5 steps with a railing?: 1  Total Score: 12       Therapeutic Activities and Exercises:   bed mob, transfers and amb as described above with increased time to complete; pt responding to VCs/MCs 10% of time; pt required frequent VCs/MCs during amb as above    Patient left up in chair with all lines intact, call button in reach and nurse notified..    GOALS:    Physical Therapy Goals        Problem: Physical Therapy Goal    Goal Priority Disciplines Outcome Goal Variances Interventions   Physical Therapy Goal     PT/OT, PT Ongoing (interventions implemented as appropriate)                     Time Tracking:     PT Received On: 03/05/18  PT Start Time: 1322     PT Stop Time: 1415  PT Total Time (min): 53 min     Billable Minutes: Gait Training 15 minutes and Therapeutic Activity 8 minutes    Treatment Type: Treatment  PT/PTA: PT     PTA Visit Number: 0     Antonina Royal  PT  03/05/2018

## 2018-03-06 NOTE — PT/OT/SLP PROGRESS
Physical Therapy Treatment    Patient Name:  Verna Carranza   MRN:  [id number]    Recommendations:     Discharge Recommendations:  home with home health, home with hospice, home health PT   Discharge Equipment Recommendations: bedside commode, wheelchair   Barriers to discharge: None    Assessment:     Verna Carranza is a 78 y.o. female admitted with a medical diagnosis of Septic shock.  She presents with the following impairments/functional limitations:  weakness, impaired endurance, impaired self care skills, impaired cognition, impaired functional mobilty, gait instability, impaired balance, decreased lower extremity function, decreased upper extremity function, decreased safety awareness, abnormal tone, decreased ROM, impaired coordination, impaired joint extensibility, impaired muscle length Patient verbalizing minimally today; increased mobility/amb; will cont with POC..    Rehab Prognosis:  good; patient would benefit from acute skilled PT services to address these deficits and reach maximum level of function.      Recent Surgery: * No surgery found *      Plan:     During this hospitalization, patient to be seen 5 x/week to address the above listed problems via therapeutic activities, gait training, therapeutic exercises  · Plan of Care Expires:  04/03/18   Plan of Care Reviewed with: patient    Subjective     Communicated with nurse prior to session.  Patient found supine upon PT entry to room, agreeable to treatment.      Patient comments/goals: Patient verbalizing minimally  Pain/Comfort:  · Pain Rating 1: 0/10  · Pain Rating Post-Intervention 1: 0/10    Patients cultural, spiritual, Yarsanism conflicts given the current situation: n/a    Objective:     Patient found with: bed alarm, peripheral IV, telemetry     General Precautions: Standard, aspiration, fall   Orthopedic Precautions:N/A   Braces: N/A     Functional Mobility:  · Bed Mobility:     · Supine to Sit: total assistance  · Sit to Supine: total  assistance  · Transfers:     · Sit to Stand:  minimum assistance and of 2 persons with no AD  · Gait: Patient amb with modA x 2 with short step length, slow marina, small SUHAS, decreased balance; 10ft and 50ft  · Balance: sit good; stand poor      AM-PAC 6 CLICK MOBILITY  Turning over in bed (including adjusting bedclothes, sheets and blankets)?: 2  Sitting down on and standing up from a chair with arms (e.g., wheelchair, bedside commode, etc.): 3  Moving from lying on back to sitting on the side of the bed?: 2  Moving to and from a bed to a chair (including a wheelchair)?: 2  Need to walk in hospital room?: 2  Climbing 3-5 steps with a railing?: 1  Total Score: 12       Therapeutic Activities and Exercises:   BLE AA/PROM ex in all available ranges of motion with stretching to major ms groups 3 x 30 sec; pt verbalizing occasionally; not responding to verbal commands but will follow manual cues and assist; bed mob, transfers, bed mob, gait as above; vitals good throughout.    Patient left HOB elevated with all lines intact, call button in reach, bed alarm on and nurse notified..    GOALS:    Physical Therapy Goals        Problem: Physical Therapy Goal    Goal Priority Disciplines Outcome Goal Variances Interventions   Physical Therapy Goal     PT/OT, PT Ongoing (interventions implemented as appropriate)                     Time Tracking:     PT Received On: 03/06/18  PT Start Time: 1005     PT Stop Time: 1058  PT Total Time (min): 53 min with OT    Billable Minutes: Gait Training 9 minutes and Therapeutic Exercise 15 minutes    Treatment Type: Treatment  PT/PTA: PT     PTA Visit Number: 0     Antonina Royal, PT  03/06/2018

## 2018-03-06 NOTE — PLAN OF CARE
Problem: Occupational Therapy Goal  Goal: Occupational Therapy Goal  Goals to be met by: 04/04/18     Patient will increase functional independence with ADLs by performing:    Sitting at edge of bed x15 minutes with Supervision.  Supine to sit with Minimal Assistance.  Toilet transfer to bedside commode with Minimal Assistance.     Outcome: Ongoing (interventions implemented as appropriate)  Slow progress. Initiated family training in stand step t/f bed<>chair,   UE positioning for contracture management/reduction, sitting up right for safe feeding and swallow tech in BS chair.  Continue with OT POC.

## 2018-03-06 NOTE — PLAN OF CARE
Recommendations     Recommendation/Intervention:   1. Diet texture per SLP   2. Assist with feeds   3. Consult if family/team desire nutrition support   4. RD to monitor     Goals: Pleasure feeds  Nutrition Goal Status: new  Communication of RD Recs: reviewed with RN     Continuum of Care Plan     Referral to Outpatient Services:  (D/C planning: pleasure feeds; hospice)

## 2018-03-06 NOTE — PROGRESS NOTES
Spoke with Dr. Frederick in relation to patient temp being 101 from this morning while still under warming blanket. Temp decrease to 100.2 and then 97.7 after warming blanket removed. Order in to cancel blood cultures and urine cultures at this time. Will continue to monitor.

## 2018-03-06 NOTE — PT/OT/SLP PROGRESS
Occupational Therapy   Treatment    Name: Verna Carranza  MRN: [id number]  Admitting Diagnosis:  Septic shock       Recommendations:     Discharge Recommendations: home with hospice  Discharge Equipment Recommendations:  bedside commode, wheelchair  Barriers to discharge:  None    Subjective     Communicated with: RN prior to session.  Pain/Comfort:  · Pain Rating 1: 0/10  · Pain Rating Post-Intervention 1: 0/10    Patients cultural, spiritual, Presybeterian conflicts given the current situation: na    Objective:     Patient found with: bed alarm, telemetry, peripheral IV    General Precautions: Standard, aspiration, fall   Orthopedic Precautions:N/A   Braces: N/A     Occupational Performance:    Bed Mobility:    · Patient completed Rolling/Turning to Left with  total assistance  · Patient completed Scooting/Bridging with total assistance and 2 persons  · Patient completed Supine to Sit with total assistance  · Patient completed Sit to Supine with total assistance     Functional Mobility/Transfers:  · Patient completed Sit <> Stand Transfer with minimum assistance, of 2 persons, HHA B'ly.  · Functional Mobility:  Mod A x 2, HHA B'ly.  See PT notes for details.    Activities of Daily Living:  · Grooming: total assistance to wipe face with wash cloth    Patient left supine with all lines intact, call button in reach and bed alarm on    AMPA 6 Click:  AMPA Total Score: 6    Treatment & Education:  Pt provided with BUE PROM x 5 reps all jts/planes.  Pt resistive Education:  initially.   Pt sat EOB x 10-15 min with CGA-SBA.  Pt randomly reaching for objects, and sucking on fingers, as well as chewing with mouth.  She occasionally responds yes/no to simple questions, minimal to no eye contact and no command following.     Assessment:     Verna Carranza is a 78 y.o. female with a medical diagnosis of Septic shock.  She presents with decreased cognition and decreased overall strength, endurance, balance and [place] and  safety with ADL's and fx mobility.   Performance deficits affecting function are weakness, impaired endurance, impaired self care skills, impaired functional mobilty, gait instability, impaired balance, impaired cognition, decreased safety awareness, abnormal tone, decreased ROM, decreased coordination, decreased upper extremity function, decreased lower extremity function, impaired coordination, impaired fine motor.  Pt requires Tot A for bed mobility and basic self care.   She transfers with Min A x 1-2. Continue OT services to address functional goals, progressing as able.      Rehab Prognosis:  poor; patient would benefit from acute skilled OT services to address these deficits and reach maximum level of function.       Plan:     Patient to be seen 3 x/week to address the above listed problems via self-care/home management, therapeutic activities, therapeutic exercises  · Plan of Care Expires: 04/04/18  · Plan of Care Reviewed with: patient    This Plan of care has been discussed with the patient who was involved in its development and understands and is in agreement with the identified goals and treatment plan    GOALS:    Occupational Therapy Goals        Problem: Occupational Therapy Goal    Goal Priority Disciplines Outcome Interventions   Occupational Therapy Goal     OT, PT/OT Ongoing (interventions implemented as appropriate)    Description:  Goals to be met by: 04/04/18     Patient will increase functional independence with ADLs by performing:    Sitting at edge of bed x15 minutes with Supervision.  Supine to sit with Minimal Assistance.  Toilet transfer to bedside commode with Minimal Assistance.                      Time Tracking:     OT Date of Treatment: 03/06/18  OT Start Time: 1005  OT Stop Time: 1042  OT Total Time (min): 37 min    Billable Minutes:Therapeutic Activity 13  Therapeutic Exercise 10   *cotx with PT    SHAWNA Dunlap  3/6/2018

## 2018-03-06 NOTE — PLAN OF CARE
Problem: Physical Therapy Goal  Goal: Physical Therapy Goal  Outcome: Ongoing (interventions implemented as appropriate)  Patient verbalizing minimally today; increased mobility/amb; will cont with POC.

## 2018-03-06 NOTE — PLAN OF CARE
Problem: Occupational Therapy Goal  Goal: Occupational Therapy Goal  Goals to be met by: 04/04/18     Patient will increase functional independence with ADLs by performing:    Sitting at edge of bed x15 minutes with Supervision.  Supine to sit with Minimal Assistance.  Toilet transfer to bedside commode with Minimal Assistance.     Outcome: Ongoing (interventions implemented as appropriate)  Verna Carranza is a 78 y.o. female with a medical diagnosis of Septic shock.  She presents with decreased cognition and decreased overall strength, endurance, balance and [place] and safety with ADL's and fx mobility.   Performance deficits affecting function are weakness, impaired endurance, impaired self care skills, impaired functional mobilty, gait instability, impaired balance, impaired cognition, decreased safety awareness, abnormal tone, decreased ROM, decreased coordination, decreased upper extremity function, decreased lower extremity function, impaired coordination, impaired fine motor.  Pt requires Tot A for bed mobility and basic self care.   She transfers with Min A x 1-2. Continue OT services to address functional goals, progressing as able.  JESUS Dunlap/JELLY

## 2018-03-06 NOTE — PLAN OF CARE
Patient to d/c today to home with [facility]. All DME has been delivered.  TN has requested Ambulance auth from [facility]. Call placed to Grand daughter Lisy, awaiting call back.   TN verified with Bedside RN CRUZ, pt is on room air, no supplemental oxygen needs at this time.      update: TN spoke with Family, Lisy will come  patient between 3:30pm- 4pm. Ambulance offered, Granddaughter would like to transport patient home.    Arcelia with [facility] updated of above.        03/06/18 1416   Final Note   Assessment Type Final Discharge Note   Discharge Disposition [facility]   What phone number can be called within the next 1-3 days to see how you are doing after discharge? [phone]   Hospital Follow Up  Appt(s) scheduled? Yes   Discharge plans and expectations educations in teach back method with documentation complete? Yes   Right Care Referral Info   Post Acute Recommendation Other   Referral Type [facility]

## 2018-03-06 NOTE — DISCHARGE SUMMARY
[facility] Discharge Summary    Primary Team: [facility] IM Team A  Attending Physician: Anju Cantu MD  Resident: Ross  Intern: Otoniel    Date of Admit: 2/26/2018  Date of Discharge: 3/6/2018    Discharge to: Home with home hospice  Condition: Stable    Discharge Diagnoses     Patient Active Problem List   Diagnosis    Altered mental state    Septic shock    Acute respiratory failure with hypoxia and hypercapnia    Acute encephalopathy    Pneumonia of right lower lobe due to infectious organism    AMY (acute kidney injury)    CKD (chronic kidney disease), stage III    Transaminitis    Elevated troponin    Advanced dementia    Bradycardia    Seizure    Acidosis    Shock    Hypokalemia    Hypomagnesemia       Consultants and Procedures     Consultants:  Neurology  Pulmonology  PT/OT  SLP    Procedures:   None    Brief History of Present Illness      78yoF w/HTN, HLD, advanced Alzheimer's dementia (speaks inappropriate words at baseline, unable to perform ADLs, is administered meds at home by her son, eats regular food but has to be fed) was in her usual state of health until about 3-4 days ago when her son, Simba, noticed that she was sleeping more than usual. He denies noticing fevers, chills or any other symptoms other than wanting to sleep more during the day, although he reports she still ws not sleeping much at night. He does report that the patient only recently started taking donepezil a few days ago. Then, last night, Simba gave her twice as much trazodone as usual (took 150mg last night) because. This morning, the patient's other son went to the house to check on her and found her with decreased mentation from her baseline and completely nonverbal. It is unclear whether or not this was an acute change since last night or if the patient has been this way the last few days as there are conflicting accounts from family but the visiting son decided to bring her to the ER.       Initially, the patient was found to be bradycardic to the 40s at [facility] but had a normal temp, was normotensive with normal RR. CT head was negative and patient was to be admitted for AMS workup. However, while waiting for transport, the patient developed hypotension and was started on dopamine and phenylephrine by the ER after central line placement.     History obtained by patient's son and primary caretaker, Simba as well as patient's granddaughter, Margarette and her father (the patient's other son) as the patient is non-verbal.     For the full HPI please refer to the History & Physical from this admission.    Hospital Course By Problem with Pertinent Findings     Acute respiratory failure and septic shock 2/2 aspiration pneumonia  -P/w few days of decreased mentation from baseline per family  -On admit: hypothermic, bradycardic, hypotensive with lactic acidosis, abnormal troponin, AMY  -CXR negative for acute abnormality.  TTE wnl.  CT A/P w/possible colitis  -Required dopamine, phenylephrine by OSH.  Transitioned to levophed and weaned as tolerated.   -Intubated 2/26.  Successfully extubated 3/2.  Remained stable on RA afterwards.  -Discharged with moxifloxacin 14 total day course.  -Discharged with home hospice per family wishes.     Acute encephalopathy in patient with Alzheimer's dementia  -Likely 2/2 sepsis as above.  -Neuro consulted.  CT head with stable cerebral atrophy; MRI with signs of normal pressure hydrocephalus and cerebral atrophy. EEG without focal seizure, diffuse slowing.   -Continued home namenda on discharge.  -Discharged with keppra 1500 BID.  -Discharged with home hospice per family wishes.    AMY on CKD3, resolved  -Improved s/p IVF     AoCD, thrombocytopenia  -Chronic and stable.  Required 1 unit platelets during inpatient stay.     Benign essential HTN  -Continued home norvasc, toprol-xl on discharge      HLD  -Continue home statin    Discharge Medications        Medication  List      START taking these medications    levETIRAcetam 750 MG Tab  Commonly known as:  KEPPRA  Take 2 tablets (1,500 mg total) by mouth 2 (two) times daily.     moxifloxacin 400 mg tablet  Commonly known as:  AVELOX  Take 1 tablet (400 mg total) by mouth once daily.  Start taking on:  3/7/2018        CONTINUE taking these medications    amLODIPine 5 MG tablet  Commonly known as:  NORVASC     aspirin 81 MG Chew     lisinopril 20 MG tablet  Commonly known as:  PRINIVIL,ZESTRIL     memantine 10 MG Tab  Commonly known as:  NAMENDA     metoprolol succinate 50 MG 24 hr tablet  Commonly known as:  TOPROL-XL     ondansetron 4 MG Tbdl  Commonly known as:  ZOFRAN-ODT  Take 1 tablet (4 mg total) by mouth every 8 (eight) hours as needed.     rosuvastatin 10 MG tablet  Commonly known as:  CRESTOR     traZODone 100 MG tablet  Commonly known as:  DESYREL        STOP taking these medications    metoprolol tartrate 100 MG tablet  Commonly known as:  LOPRESSOR           Where to Get Your Medications      You can get these medications from any pharmacy    Bring a paper prescription for each of these medications  · levETIRAcetam 750 MG Tab  · moxifloxacin 400 mg tablet         Discharge Information:   Diet:  Pleasure pureed diet, no liquids    Physical Activity:  As tolerated    Instructions:  1. Take all medications as prescribed  2. Keep all follow-up appointments  3. Return to the hospital or call your primary care physicians if any worsening symptoms occur.    Follow-Up Appointments:  Follow up with your PCP in 1-2 weeks.    Naz Frederick  [facility], [facility]

## 2018-03-14 NOTE — PHYSICIAN QUERY
"PT Name: Verna Carranza  MR #: [id number]    Physician Query Form - Hematology Clarification      CDS: Gloria Mendes RN, CCDS       Contact information: [email or web address]    This form is a permanent document in the medical record.      Query Date: March 14, 2018    By submitting this query, we are merely seeking further clarification of documentation. Please utilize your independent clinical judgment when addressing the question(s) below.    The Medical record contains the following:   Indicators  Supporting Clinical Findings Location in Medical Record   X "Anemia" documented Anemia with active blood loss 3/1-Hosp Med PN   X H & H = Hgb=6.0, Hct=17.5 3/1-Labs    BP =                     HR=      "GI bleeding" documented     X Acute bleeding (Non GI site) Pt had large amount of bleeding from IJ x2. Attempted to stop bleeding by holding pressure for 5 minutes and applying surgicel. MD notified--Ok to discontinue IJ.  3/1-RN POC   X Transfusion(s) 2u PRBC's 3/1-Flowsheet    Treatment:     X Other:  CVC was pulled due to bleeding.  As she is hemodynamically stable I agree that it is reasonable to await completion of PRBC xfusion and repeat H&H.  If response to transfusion is insufficient, she will need CT angio of abdomen/pelvis in an attempt to identify a bleeding vessel that might be amenable to percutaneous intervention.  3/1-Hosp Med Attestation     Provider, please specify diagnosis or diagnoses associated with above clinical findings.    [ x ] Acute blood loss anemia  [  ] Precipitous drop in Hematocrit  [  ] Other Hematological Diagnosis (please specify): _________________________________  [  ] Clinically Undetermined       Please document in your progress notes daily for the duration of treatment, until resolved, and include in your discharge summary.                                                                                                      "